# Patient Record
Sex: FEMALE | Race: WHITE | NOT HISPANIC OR LATINO | Employment: FULL TIME | URBAN - METROPOLITAN AREA
[De-identification: names, ages, dates, MRNs, and addresses within clinical notes are randomized per-mention and may not be internally consistent; named-entity substitution may affect disease eponyms.]

---

## 2018-02-27 ENCOUNTER — APPOINTMENT (OUTPATIENT)
Dept: WOMENS IMAGING | Facility: HOSPITAL | Age: 48
End: 2018-02-27

## 2018-02-27 PROCEDURE — 77067 SCR MAMMO BI INCL CAD: CPT | Performed by: RADIOLOGY

## 2019-05-22 ENCOUNTER — APPOINTMENT (OUTPATIENT)
Dept: WOMENS IMAGING | Facility: HOSPITAL | Age: 49
End: 2019-05-22

## 2019-05-22 PROCEDURE — 77063 BREAST TOMOSYNTHESIS BI: CPT | Performed by: RADIOLOGY

## 2019-05-22 PROCEDURE — 77067 SCR MAMMO BI INCL CAD: CPT | Performed by: RADIOLOGY

## 2020-05-04 ENCOUNTER — TELEPHONE (OUTPATIENT)
Dept: ORTHOPEDIC SURGERY | Facility: CLINIC | Age: 50
End: 2020-05-04

## 2020-05-27 ENCOUNTER — OFFICE VISIT (OUTPATIENT)
Dept: ORTHOPEDIC SURGERY | Facility: CLINIC | Age: 50
End: 2020-05-27

## 2020-05-27 ENCOUNTER — APPOINTMENT (OUTPATIENT)
Dept: WOMENS IMAGING | Facility: HOSPITAL | Age: 50
End: 2020-05-27

## 2020-05-27 VITALS — WEIGHT: 229 LBS | BODY MASS INDEX: 36.8 KG/M2 | HEIGHT: 66 IN | TEMPERATURE: 97 F

## 2020-05-27 DIAGNOSIS — M72.2 PLANTAR FASCIITIS: ICD-10-CM

## 2020-05-27 DIAGNOSIS — M79.672 LEFT FOOT PAIN: Primary | ICD-10-CM

## 2020-05-27 PROCEDURE — 77063 BREAST TOMOSYNTHESIS BI: CPT | Performed by: RADIOLOGY

## 2020-05-27 PROCEDURE — 99204 OFFICE O/P NEW MOD 45 MIN: CPT | Performed by: ORTHOPAEDIC SURGERY

## 2020-05-27 PROCEDURE — 73630 X-RAY EXAM OF FOOT: CPT | Performed by: ORTHOPAEDIC SURGERY

## 2020-05-27 PROCEDURE — 77067 SCR MAMMO BI INCL CAD: CPT | Performed by: RADIOLOGY

## 2020-05-27 RX ORDER — BUPROPION HYDROCHLORIDE 150 MG/1
TABLET ORAL
COMMUNITY
Start: 2020-03-06 | End: 2020-05-29

## 2020-05-27 RX ORDER — DICLOFENAC SODIUM 75 MG/1
75 TABLET, DELAYED RELEASE ORAL 2 TIMES DAILY
Qty: 60 TABLET | Refills: 1 | Status: SHIPPED | OUTPATIENT
Start: 2020-05-27 | End: 2020-07-28

## 2020-05-27 NOTE — PATIENT INSTRUCTIONS
[facility]  Tavares Villa MD    PLANTAR FASCIITIS / HEEL PAIN    Plantar Fasciitis is a very common condition that affects people of all ages.  Frequent symptoms include heel pain that is worse upon arising in the morning or when arising from prolonged sitting.  Occasionally it feels as if there is a nail driven into your heel or there is burning pain about the heel.  These symptoms may be present for months prior to being seen in the doctor’s office.      Certain conditions may aggravate this heel pain.  These conditions include recent weight gain, a change in activity, such as increasing mileage with running, running on different surfaces, a change in exercise shoes, or worn out shoes.     The heel pain is caused by inflammation of the plantar fascia.  This fascia attaches to the heel and occasionally a bone spur is seen on x-ray.  This bone spur is only associated with the inflammation and is not the cause of the pain.         Treatment is directed at decreasing the inflammation and protecting the heel.  Specific treatments we may use are listed below.    1. Stretching the Achilles tendon, hamstrings, and plantar fascia  2. Ice applied for 20 minutes in the evening daily  3. Shoes with supportive heels and cushioned mid soles  4. Night splinting  5. Nonsteroidal anti-inflammatory medications  6. Heel padding or heel cups  7. Orthotic devices    Approximately 90% of patients will have resolution of their symptoms with this treatment.  Very rarely will an injection or surgery be necessary.  Plantar fasciitis is like bursitis of the shoulder and it may take up to one year to resolve.  Like bursitis, it may flair up again and require additional treatment.     After your initial visit, if you have not improved, we will see you in 6-8 weeks to reassess our therapy.     Do not be discouraged.  You will get better, but it may take time.

## 2020-05-27 NOTE — PROGRESS NOTES
"   New Patient Complaint      Patient: Valerie Latham  YOB: 1970 49 y.o. female  Medical Record Number: [id number]    Chief Complaints: My heel hurts    History of Present Illness: Patient reports onset of pain in the plantar aspect of her left heel that began in late September/early October of last year to her she jumped into the shallow end of a pool while in [place] on a girls trip and was living in that area at that time.  She saw an orthopedist in mid-October and was felt to have plantar fasciitis or less likely a stress fracture and has since been doing some ice massage and using accommodative shoes but no stretching or night splint.  She reports moderate constant aching burning pain in the plantar aspect of the left heel but symptoms are worse upon first arising in the morning which improved some with ambulation and recur after standing from prolonged seated position.  She continues to walk 3 miles every other day doing a lot of hills at a park but it is \"not comfortable\".  Symptoms have been improved some with use of ice.        HPI    Allergies: No Known Allergies    Medications:   Current Outpatient Medications on File Prior to Visit   Medication Sig   • buPROPion XL (WELLBUTRIN XL) 150 MG 24 hr tablet      No current facility-administered medications on file prior to visit.        History reviewed. No pertinent past medical history.  Past Surgical History:   Procedure Laterality Date   • HYSTERECTOMY     • LIPOMA EXCISION       Social History     Occupational History   • Not on file   Tobacco Use   • Smoking status: Never Smoker   Substance and Sexual Activity   • Alcohol use: Yes     Comment: OCC   • Drug use: Not on file   • Sexual activity: Not on file      Social History     Social History Narrative   • Not on file     History reviewed. No pertinent family history.    Review of Systems: 14 point review of systems performed, positive pertinent findings identified in HPI. All remaining systems " "negative except headaches or migraines, hayfever    Review of Systems      Physical Exam:   Vitals:    05/27/20 0938   Temp: 97 °F (36.1 °C)   Weight: 104 kg (229 lb)   Height: 167.6 cm (66\")   PainSc:   7     Physical Exam   Constitutional: pleasant, well developed   Eyes: sclera non icteric  Hearing : adequate for exam  Cardiovascular: palpable pulses in left foot, left calf/ thigh NT without sign of DVT  Respiratoy: breathing unlabored   Neurological: grossly sensate to LT throughout left LE  Psychiatric: oriented with normal mood and affect.   Lymphatic: No palpable popliteal lymphadenopathy left LE  Skin: intact throughout left leg/foot  Musculoskeletal: She has neutral dorsiflexion of the left heel cord with moderate discomfort to palpation at the insertion of the plantar fascia but no warmth erythema.  There is minimal prominence at the insertion of the Achilles but no tenderness to palpation and no pain in the retrocalcaneal bursa with maximum plantarflexion.  No pain with medial lateral calcaneal compression.  Nontender over the dorsum of the midfoot  Physical Exam  Ortho Exam    Radiology: 3 views left foot ordered evaluate pain reviewed and no prior x-rays available for comparison but did review a report from [place] dated 10/17/2019 which described a plantar calcaneal spur and os trigonum.  X-rays today demonstrate an os trigonum as well as some small spur of the dorsum of the talar neck there is plantar calcaneal spurring and a small spur at the insertion of the Achilles with prominent superior calcaneal tuberosity.  No malalignment noted through the midfoot.    Assessment/Plan: 1.  Left heel pain consistent with plantar fasciitis  2.  Asymptomatic calcific insertional Achilles spurring and os trigonum.    Reviewed treatment options and her symptoms are fairly classic for plantar fasciitis and no see any sign of stress fracture on exam or x-ray.    Plantar fascitis etiology and  treatment protocol " discussed with pt. Information sheet provided and reviewed. This with consist of a night splint that was provided and discussed etiology of use. Towel stretch prior to first step in morning. Avoid barefoot ambulation. Gastroc and soleus heel cord stretch 4-5 times throughout the day, for 2-1/2 minutes of each session.  instruction sheet provided and demonstrated for patient. Use of shoes with arch support and ice bottle massage for 15-20 minutes each night. Counseled that improvement may take several months. Additional treatment modalities may include injection, therapy, or immobilization. I do not have anything to offer regarding surgical treatment at this time.  We will also have her start Voltaren SR 75 mg 1 p.o twice daily with GI precautions.    Also counseled to limit her activities to low impact and she wants to continue walking in the park do not do hills and only do flat level walking every other day.    I will check her back in 4 weeks and if she is not improving we will consider injection and therapy if she is we will discuss maintenance treatment going forward.    He had a pleasant visit and went to high school together her maiden name was Lynn.

## 2020-05-29 ENCOUNTER — CONSULT (OUTPATIENT)
Dept: ORTHOPEDIC SURGERY | Facility: CLINIC | Age: 50
End: 2020-05-29

## 2020-05-29 VITALS — BODY MASS INDEX: 38.55 KG/M2 | TEMPERATURE: 96 F | HEIGHT: 65 IN | WEIGHT: 231.4 LBS

## 2020-05-29 DIAGNOSIS — M53.3 COCCYX PAIN: Primary | ICD-10-CM

## 2020-05-29 DIAGNOSIS — M54.50 LUMBAR BACK PAIN: ICD-10-CM

## 2020-05-29 PROCEDURE — 20552 NJX 1/MLT TRIGGER POINT 1/2: CPT | Performed by: ORTHOPAEDIC SURGERY

## 2020-05-29 PROCEDURE — 99203 OFFICE O/P NEW LOW 30 MIN: CPT | Performed by: ORTHOPAEDIC SURGERY

## 2020-05-29 PROCEDURE — 72220 X-RAY EXAM SACRUM TAILBONE: CPT | Performed by: ORTHOPAEDIC SURGERY

## 2020-05-29 RX ORDER — METHYLPREDNISOLONE ACETATE 80 MG/ML
80 INJECTION, SUSPENSION INTRA-ARTICULAR; INTRALESIONAL; INTRAMUSCULAR; SOFT TISSUE
Status: COMPLETED | OUTPATIENT
Start: 2020-05-29 | End: 2020-05-29

## 2020-05-29 RX ADMIN — METHYLPREDNISOLONE ACETATE 80 MG: 80 INJECTION, SUSPENSION INTRA-ARTICULAR; INTRALESIONAL; INTRAMUSCULAR; SOFT TISSUE at 10:43

## 2020-05-29 NOTE — PROGRESS NOTES
New patient or new problem visit    Chief Complaint   Patient presents with   • Lumbar Spine - Initial Evaluation, Pain       HPI: She complains of coccydynia and lumbar spine pain.  Coccydynia is gone on about a year the backache longer.  No numbness tingling weakness bowel or bladder complaints sometimes she feels like she is shaking.  She is tried a ring pillow without relief.  No history of trauma.  She has a standing desk which helps somewhat with her back pain.  No numbness tingling or weakness.  No bowel or bladder complaints    PFSH: See chart- reviewed    Review of Systems   Constitutional: Negative for chills, fever and unexpected weight change.   HENT: Negative for trouble swallowing and voice change.    Eyes: Negative for visual disturbance.   Respiratory: Negative for cough and shortness of breath.    Cardiovascular: Negative for chest pain and leg swelling.   Gastrointestinal: Negative for abdominal pain, nausea and vomiting.   Endocrine: Negative for cold intolerance and heat intolerance.   Genitourinary: Negative for difficulty urinating, frequency and urgency.   Musculoskeletal: Positive for neck pain.   Skin: Negative for rash and wound.   Allergic/Immunologic: Negative for immunocompromised state.   Neurological: Negative for weakness and numbness.   Hematological: Does not bruise/bleed easily.   Psychiatric/Behavioral: Negative for dysphoric mood. The patient is not nervous/anxious.        PE: Constitutional: Vital signs above-noted.  Awake, alert and oriented    Psychiatric: Affect and insight do not appear grossly disturbed.    Pulmonary: Breathing is unlabored, color is good.    Skin: Warm, dry and normal turgor    Cardiac: Pedal pulses intact.  No edema.    Eyesight and hearing appear adequate for examination purposes      Musculoskeletal:  There is mild tenderness to percussion and palpation of the lumbosacral spine.  The coccyx is tender to palpation the skin over the area is normal.   Motion of the back appears undisturbed.  Posture is unremarkable to coronal and sagittal inspection.    The skin about the area is intact.  There is no palpable or visible deformity.  There is no local spasm.       Neurologic:   Reflexes are 2+ and symmetrical in the patellae and achilles.   Motor function is undisturbed in quadriceps, EHL, and gastrocnemius   sensation appears symmetrically intact to light touch.  In the bilateral lower extremities there is no evidence of atrophy.   Clonus is absent..  Gait appears undisturbed.  SLR test negative      MEDICAL DECISION MAKING    XRAY: X-rays of the sacrum show irregular mid coccygeal segment but overall alignment is unremarkable in appearance is unremarkable    Other: n/a    Impression: Coccydynia and low back pain    Plan: I have recommended injection of the coccyx which I performed today with good immediate relief from the anesthetic effect.  We will see what the steroid does.  With regard to the back exercise or activity, continue standing desk and return to see me if pain worsens.    Large Joint Arthrocentesis  Date/Time: 5/29/2020 10:43 AM  Consent given by: patient  Timeout: Immediately prior to procedure a time out was called to verify the correct patient, procedure, equipment, support staff and site/side marked as required   Supporting Documentation  Indications: pain   Procedure Details  Location: COCCYX INJECTION.  Needle size: 25 G  Approach: posterior  Medications administered: 2 mL lidocaine (cardiac); 80 mg methylPREDNISolone acetate 80 MG/ML  Patient tolerance: patient tolerated the procedure well with no immediate complications

## 2020-07-20 ENCOUNTER — OFFICE VISIT (OUTPATIENT)
Dept: ORTHOPEDIC SURGERY | Facility: CLINIC | Age: 50
End: 2020-07-20

## 2020-07-20 VITALS — BODY MASS INDEX: 38.15 KG/M2 | TEMPERATURE: 97.5 F | WEIGHT: 229 LBS | HEIGHT: 65 IN

## 2020-07-20 DIAGNOSIS — M72.2 PLANTAR FASCIITIS: Primary | ICD-10-CM

## 2020-07-20 PROCEDURE — 99213 OFFICE O/P EST LOW 20 MIN: CPT | Performed by: ORTHOPAEDIC SURGERY

## 2020-07-20 RX ORDER — NYSTATIN 100000 U/G
CREAM TOPICAL
COMMUNITY
Start: 2020-05-27 | End: 2020-11-13

## 2020-07-23 ENCOUNTER — HOSPITAL ENCOUNTER (OUTPATIENT)
Dept: MRI IMAGING | Facility: HOSPITAL | Age: 50
Discharge: HOME | End: 2020-07-23
Admitting: ORTHOPAEDIC SURGERY

## 2020-07-23 DIAGNOSIS — M72.2 PLANTAR FASCIITIS: ICD-10-CM

## 2020-07-23 PROCEDURE — 73721 MRI JNT OF LWR EXTRE W/O DYE: CPT

## 2020-07-27 ENCOUNTER — OFFICE VISIT (OUTPATIENT)
Dept: ORTHOPEDIC SURGERY | Facility: CLINIC | Age: 50
End: 2020-07-27

## 2020-07-27 VITALS — WEIGHT: 229 LBS | BODY MASS INDEX: 36.8 KG/M2 | TEMPERATURE: 98 F | HEIGHT: 66 IN

## 2020-07-27 DIAGNOSIS — S93.402S SPRAIN OF LEFT ANKLE, UNSPECIFIED LIGAMENT, SEQUELA: ICD-10-CM

## 2020-07-27 DIAGNOSIS — S86.312D PERONEAL TENDON TEAR, LEFT, SUBSEQUENT ENCOUNTER: ICD-10-CM

## 2020-07-27 DIAGNOSIS — M25.372 INSTABILITY OF LEFT ANKLE JOINT: ICD-10-CM

## 2020-07-27 DIAGNOSIS — M72.2 PLANTAR FASCIITIS: Primary | ICD-10-CM

## 2020-07-27 PROBLEM — S93.402A SPRAIN OF LEFT ANKLE: Status: ACTIVE | Noted: 2020-07-27

## 2020-07-27 PROCEDURE — 20550 NJX 1 TENDON SHEATH/LIGAMENT: CPT | Performed by: ORTHOPAEDIC SURGERY

## 2020-07-27 PROCEDURE — 99214 OFFICE O/P EST MOD 30 MIN: CPT | Performed by: ORTHOPAEDIC SURGERY

## 2020-07-28 RX ORDER — METHYLPREDNISOLONE ACETATE 80 MG/ML
INJECTION, SUSPENSION INTRA-ARTICULAR; INTRALESIONAL; INTRAMUSCULAR; SOFT TISSUE
Qty: 1 ML | Refills: 0
Start: 2020-07-28 | End: 2020-11-13

## 2020-07-28 RX ORDER — DICLOFENAC SODIUM 75 MG/1
TABLET, DELAYED RELEASE ORAL
Qty: 60 TABLET | Refills: 1 | Status: SHIPPED | OUTPATIENT
Start: 2020-07-28 | End: 2020-11-13

## 2020-07-28 RX ORDER — LIDOCAINE HYDROCHLORIDE 20 MG/ML
2 INJECTION, SOLUTION INFILTRATION; PERINEURAL ONCE
Status: DISCONTINUED | OUTPATIENT
Start: 2020-07-28 | End: 2021-02-12

## 2020-07-28 NOTE — PROGRESS NOTES
"Ankle Follow Up      Patient: Valerie Latham    YOB: 1970 49 y.o. female    Chief Complaints: Ankle and heel pain    History of Present Illness:Patient was initially seen on 5/27/2020 and reported onset of pain in the plantar aspect of her left heel that began in late September/early October of last year after she jumped into the shallow end of a pool while in [place] on a girls trip and was living in that area at that time.  She saw an orthopedist in mid-October and was felt to have plantar fasciitis or less likely a stress fracture and had since been doing some ice massage and using accommodative shoes but no stretching or night splint.       She reported moderate constant aching burning pain in the plantar aspect of the left heel but symptoms are worse upon first arising in the morning which improved some with ambulation and recur after standing from prolonged seated position.  She was continuing to walk 3 miles every other day doing a lot of hills at a park but it is \"not comfortable\".  Symptoms had been improved some with use of ice.     At that time I felt that symptoms were consistent with plantar fasciitis and she was instructed on use of a night splint, heel cord stretching exercises, use of arch support and ice massage as well as use of Voltaren SR 75 mg twice daily and to limit activities.     She was last seen on 7/20/2020 and had been doing as such stretching at least 3 times a day and the night splint has helped with her start up pain in the morning but still had moderate to severe stabbing pain in the plantar aspect of the heel more so than posteriorly as well as some medial and lateral discomfort after walking activities and prolonged standing.  Symptoms are exacerbated few days prior to that visit after walking a mile and a half.    She was sent for MRI and is seen back today with persistent complaints of stabbing pain in the medial aspect of the hindfoot as well as discomfort in the " "posterior lateral aspect of the ankle with prolonged walking and standing.  She is not have any appreciable history of sprains in the past.  She continues to take Voltaren SR      HPI    ROS: ankle pain, no numbness or tingling  History reviewed. No pertinent past medical history.    Physical Exam:   Vitals:    07/27/20 1454   Temp: 98 °F (36.7 °C)   TempSrc: Temporal   Weight: 104 kg (229 lb)   Height: 167.6 cm (66\")   PainSc:   6   PainLoc: Foot     Well developed with normal mood.  Exam she has moderate discomfort at the insertion of the plantar fascia as well as discomfort over the peroneal tendons to palpation worse with resisted eversion but no subluxation.  Mild discomfort with anterolateral ligamentous structures but without gross instability on anterior drawer but with some guarding.      Radiology: MRI films and report of the left hindfoot dated 7/23/2020 show a tear in the central medial aspect of the plantar aponeurosis at its origin with abnormal thickening at the medial band just distal to this tear.  Lateral fibers the medial band and lateral band are intact.  There is degenerative plantar calcaneal spur and surrounding soft tissue edema.    There is also chronic thickening of the dorsal talonavicular ligament and mild thickening of the ATFL the syndesmosis and deltoid are intact.    There is a peroneus brevis split tear and the brevis is wrapped around the anterior aspect of the longus distending the peroneal tendon sheath with mild thickening of the longus representing tendinopathy.  Also chronic mild thickening of the central Achilles tendon and an os trigonum which shows no abnormal marrow      Assessment/Plan: 1.  Left high-grade partial tear of the medial band of the plantar aponeurosis  2.  Left peroneus brevis split tear with fluid distention of the peroneal tendon sheath and peroneus longus tendinopathy  3.  Left ATFL thickening as well as thickening of the dorsal talonavicular ligament  4.  " Left Achilles tendinosis with os trigonum without marrow signal    We reviewed treatment options and nothing I would recommend for the plantar fascia.    The peroneal tendons are less symptomatic than is the plantar fascia but may eventually require surgical treatment as made the ATFL.    We reviewed treatment options and she will continue with use of her night splint and increase her heel cord stretching exercises to at least 4 5 times per day.  She will continue with Voltaren SR 75 mg 1 p.o. twice daily with GI precautions and use accommodative shoes with good arch support and may use an ASO brace for prolonged walking and standing activities to protect her ankle.    We discussed other treatment options and available in symptoms sterile preparation with discussion of risks which can include infection and complete plantar fascia rupture left plantar fascia area was injected with 2 cc of 2% lidocaine ( NDC 6332-208-05 lot # [id number] exp 2/1/23) and half cc of Depo-Medrol containing 80 mg/cc ( NDC 8897-1685-25 lot # [id number] exp 8/1/21) postinjection instructions were given continue with ice massage limit activities to those of daily living only.    Also have her start some physical therapy and I will see her back in approximately 4 weeks.

## 2020-08-13 ENCOUNTER — TREATMENT (OUTPATIENT)
Dept: PHYSICAL THERAPY | Facility: CLINIC | Age: 50
End: 2020-08-13

## 2020-08-13 DIAGNOSIS — M25.372 INSTABILITY OF LEFT ANKLE JOINT: ICD-10-CM

## 2020-08-13 DIAGNOSIS — M72.2 PLANTAR FASCIITIS: Primary | ICD-10-CM

## 2020-08-13 DIAGNOSIS — S86.312D PERONEAL TENDON TEAR, LEFT, SUBSEQUENT ENCOUNTER: ICD-10-CM

## 2020-08-13 PROCEDURE — 97110 THERAPEUTIC EXERCISES: CPT | Performed by: PHYSICAL THERAPIST

## 2020-08-13 PROCEDURE — 97140 MANUAL THERAPY 1/> REGIONS: CPT | Performed by: PHYSICAL THERAPIST

## 2020-08-13 PROCEDURE — 97530 THERAPEUTIC ACTIVITIES: CPT | Performed by: PHYSICAL THERAPIST

## 2020-08-13 PROCEDURE — 97162 PT EVAL MOD COMPLEX 30 MIN: CPT | Performed by: PHYSICAL THERAPIST

## 2020-08-13 NOTE — PROGRESS NOTES
Orthopedic / Sports / Industrial Physical Therapy  Physical Therapy Initial Evaluation and Plan of Care    Patient Name: Valerie Latham,          :  1970  Referring Physician: Tavraes Villa MD  Diagnosis:  Plantar Fasciitis (L);  Peroneal Tendon tear (L); Instability of (L) ankle joint  Date of Evaluation: 2020  __________________________________________________________    Subjective Evaluation    History of Present Illness  Mechanism of injury: Jumped into shallow end of pool causing heel pain - saw orthopedic in [place], [place] and was diagnosed with plantar fasciitis - did stretching and icing -  Since started walking programs 3-4 miles/day up/down hills -and pain increased after several weeks - Pain worse in heel and progressively got worse and sooner into walk -   Also noted onset of ankle pain (L) and saw Dr. Villa who ordered a MRI - which showed torn ligaments (per patient) - Given Cortizone in heel and ankle brace  Pt reports no known injury to ankle (sprains, etc.) other than the pool incident in [facility].         Patient Occupation: Sit at desk w/ standing desk -  Very active - walking eliptical, pilates, yoga - Pain  Current pain rating: 3  At worst pain ratin  Location: Volar heel and post/lateral ankle (L) - int numbness across toes and volar foot w/ eliptical for > 20 min, but on other time.   Quality: Dull, sharp pain - worse at times.  Alleviating factors: Brace, night splint, ice, Rest.  Exacerbated by: Walking (> 5 min), standing, sleeping w/o night splint; Stairs, uneven ground,   Symptom course: Ankle better; Heel no better -     Social Support  Patient lives at: Home w/ stairs to basement -    Diagnostic Tests  X-ray: abnormal (Per patient bone spur volar and posterior calcaneus and )  MRI studies: abnormal (See report in Epic)    Treatments  Current treatment: immobilization, injection treatment and medication  Patient Goals  Patient/family treatment goals: Pain  alleviation; Mobility, gait, strength to allow ADLs. hobbies, and normal job, etc.      MRI RESULT:2020  IMPRESSION:  1. Peroneus brevis split tear with fluid distention of the peroneal  tendon sheath/synovitis.  2. High-grade partial tear medial band plantar aponeurosis at its  origin.     This report was finalized on 2020 10:44 AM by Dr. Dean Stein M.D.   ______________________________________________  Objective          Observations     Additional Ankle/Foot Observation Details  Pt presents with lace up fig-8 ankle brace LLE -   Swelling ant, med, lat (L) ankle/foot -   Increased pronation (L) foot in standing and w/ ambulation  Very unstable w/ SLS (L)     Tenderness     Additional Tenderness Details  Tender ATFL, peroneal tendons post to lat malleolus into lateral foot (L); Tender along posterior tibialis tendon (L); Tender Volar calc and post/med volar calc (L)      Strength/Myotome Testing     Additional Strength Details  Strong DF/PF, EV/INV w/ manual resistance; Able to (B) Heel raise w/mild discomfort (L) peroneal region (L); Tender at ant/post proximal tib-fib joint (L>R)     Tests     Additional Tests Details  (-) Ant Drawer; (-) Inversion and Talar tilt (L); (-) Prox Fib Shuck / Prox tib-fib joint;         See Exercise, Manual, and Modality Logs for complete treatment.     Functional / Therapeutic Activities:  20 min  · TAPING / BRACIN) Taping to unload PF and Support medial arch (Low Dye taping) w/ Leuko tape over for medial arch support; 2) Taping to unload calcaneal fat pad (L)  · Jt protection, ADL modification; Posture and      Assessment & Plan     Assessment  Assessment details: (L) Ankle sprain; Peroneal tendon tear; Ankle Instability; Plantar fasciitis    PROBLEMS: Pain; Weakness; Abnormal gait; Intolerance to ADL's, hobbies, and normal job requiring use of LLE -   PROGNOSIS: Good    GOALS:   SHORT TERM GOALS: 2 weeks:  1) HEP Initiated; 2) Pain decreased 50%:    3) Improved proprioception grossly w/ SLS LLE w/ taping / bracin) Improved gait w/ taping / bracing;   5) improved functional ability with taping / bracing;     LONG TERM GOALS: 4 weeks (or at time of DISCHARGE): 1) (I) HEP; 2) AROM WFL and pain free; 3) Strength / mobility to be able to perform all ADL's, hobbies, and job-related activities w/w/o bracing prn; 4) Gait / endurance to allow ADL's, hobbies, and normal job w/wo bracing, etc.       Plan  Planned therapy interventions: flexibility, home exercise program, joint mobilization, manual therapy, neuromuscular re-education, postural training, orthotic fitting/training, soft tissue mobilization, strengthening, stretching and therapeutic activities (Modalities prn; Taping / bracing prn; )  Frequency: 2-3.  Duration in weeks: 4  Treatment plan discussed with: patient         _________________________________________________  Manual Therapy:    13     mins  27389;  Therapeutic Exercise:    10     mins  78637;     Neuromuscular Brayan:        mins  69195;    Therapeutic Activity:     20     mins  11475;     Gait Training:           mins  29408;     Ultrasound:     06     mins  66260;    Electrical Stimulation:         mins  01411 ( );  Dry Needling          mins self-pay  EVAL TIME:   25 min    Timed Treatment:   49   mins                  Total Treatment:     80   mins      PT SIGNATURE:   Wallace Recinos, FLACO  DATE TREATMENT INITIATED: 2020  ___________________________________________________  Initial Certification  Certification Period: 2020  I certify that the therapy services are furnished while this patient is under my care.  The services outlined above are required by this patient, and will be reviewed every 90 days.     PHYSICIAN: ________________________________  DATE: ______  Tavares Villa MD        Please sign and return via fax to [phone]. Thank you, [facility].  ______________________________________________________________________  [address] [facility] [place] [address]  Phone: [phone] Fax: [phone]    Access Code: [email or web address]   URL: [email or web address]   Date: 08/13/2020   Prepared by: Wallace Christianson  Standing Gastroc Stretch - 3-5 reps - 1 sets - 60s hold - 3x daily - 7x weekly  Standing Soleus Stretch - 3-5 reps - 1 sets - 60s hold - 3x daily - 7x weekly

## 2020-08-19 ENCOUNTER — TREATMENT (OUTPATIENT)
Dept: PHYSICAL THERAPY | Facility: CLINIC | Age: 50
End: 2020-08-19

## 2020-08-19 DIAGNOSIS — M25.372 INSTABILITY OF LEFT ANKLE JOINT: ICD-10-CM

## 2020-08-19 DIAGNOSIS — M72.2 PLANTAR FASCIITIS: Primary | ICD-10-CM

## 2020-08-19 DIAGNOSIS — S86.312D PERONEAL TENDON TEAR, LEFT, SUBSEQUENT ENCOUNTER: ICD-10-CM

## 2020-08-19 PROCEDURE — 97530 THERAPEUTIC ACTIVITIES: CPT | Performed by: PHYSICAL THERAPIST

## 2020-08-19 PROCEDURE — 97140 MANUAL THERAPY 1/> REGIONS: CPT | Performed by: PHYSICAL THERAPIST

## 2020-08-19 PROCEDURE — 97110 THERAPEUTIC EXERCISES: CPT | Performed by: PHYSICAL THERAPIST

## 2020-08-19 NOTE — PROGRESS NOTES
Physical Therapy Daily Progress Note    Patient Name: Valerie Latham         :  1970  Referring Physician: Tavares Villa MD      Subjective   Valerie Latham reports: decreased pain w/ taping of foot - No pain in heal   - Pain mostly lateral ankle and along lateral 5th ray -     Objective   Pt demonstrating good WB-ing LLE in gait -   Tender along peroneal tendons (L) - tender medial /volar-med calcaneus (L) -  Very tender ATFL (L) -   Tender proximal tib-fib joint (L) and hypermobility proximal tib-fib joint (L) -     See Exercise, Manual, and Modality Logs for complete treatment.     Functional / Therapeutic Activities:  25 min  · TAPING / BRACIN) Taping to unload PF and Support medial arch (Low Dye taping) w/ Leuko tape over for medial arch support; 2) Taping to unload calcaneal fat pad (L) 3 layers w/ leukotape 1in wide strips     3) Unload peroneal tendons (L) ankle; 4) K-tape w/ Leukotape over x 3 strips to stabilize proximal tib-fib joint   · Jt protection, ADL modification; Posture and      Assessment/Plan  (L) Ankle sprain; Peroneal tendon tear; Ankle Instability; Plantar fasciitis - Possible proximal tib-fib joint instability  Taping very helpful in decreasing pain and allowing improved gait and function -    Progress strengthening /stabilization /functional activity       _________________________________________________  Manual Therapy:    20     mins  36360;  Therapeutic Exercise:    10     mins  41365;     Neuromuscular Brayan:        mins  28072;    Therapeutic Activity:     25     mins  42084;     Gait Training:           mins  66671;     Ultrasound:     06     mins  88840;    Electrical Stimulation:         mins  28108 ( );  Dry Needling          mins self-pay    Timed Treatment:   61   mins                  Total Treatment:     65   mins    Wallace Recinos PT  Physical Therapist

## 2020-08-21 ENCOUNTER — TREATMENT (OUTPATIENT)
Dept: PHYSICAL THERAPY | Facility: CLINIC | Age: 50
End: 2020-08-21

## 2020-08-21 DIAGNOSIS — M72.2 PLANTAR FASCIITIS: Primary | ICD-10-CM

## 2020-08-21 DIAGNOSIS — M25.372 INSTABILITY OF LEFT ANKLE JOINT: ICD-10-CM

## 2020-08-21 DIAGNOSIS — S86.312D PERONEAL TENDON TEAR, LEFT, SUBSEQUENT ENCOUNTER: ICD-10-CM

## 2020-08-21 PROCEDURE — 97140 MANUAL THERAPY 1/> REGIONS: CPT | Performed by: PHYSICAL THERAPIST

## 2020-08-21 PROCEDURE — 97530 THERAPEUTIC ACTIVITIES: CPT | Performed by: PHYSICAL THERAPIST

## 2020-08-21 PROCEDURE — 97110 THERAPEUTIC EXERCISES: CPT | Performed by: PHYSICAL THERAPIST

## 2020-08-21 NOTE — PROGRESS NOTES
Physical Therapy Daily Progress Note     Patient Name: Valerie Latham         :  1970  Referring Physician: Tavares Villa MD        Subjective   Valerie Latham reports: only mild heel pain and no lateral ankle pain - no problems with exercises     Objective   Pt demonstrating good WB-ing LLE in gait -   Tender along peroneal tendons (L) - tender medial /volar-med calcaneus (L) -  Very tender ATFL (L) -   Tender proximal tib-fib joint (L) and hypermobility proximal tib-fib joint (L) -      See Exercise, Manual, and Modality Logs for complete treatment.     Functional / Therapeutic Activities:  30 min  · TAPING / BRACIN) Taping to unload PF and Support medial arch (Low Dye taping) w/ Leuko tape over for medial arch support; 2) Taping to unload calcaneal fat pad (L) 3 layers w/ leukotape 1in wide strips each direction -      3) Unload peroneal tendons (L) ankle; 4) K-tape w/ Leukotape over x 3 strips to stabilize proximal tib-fib joint   · Jt protection, ADL modification; Posture and       Assessment/Plan  (L) Ankle sprain; Peroneal tendon tear; Ankle Instability; Plantar fasciitis - Possible proximal tib-fib joint instability  Taping very helpful in decreasing pain and allowing improved gait and function -     Progress strengthening /stabilization /functional activity     _________________________________________________  Manual Therapy:            15     mins  15363;  Therapeutic Exercise:    15     mins  75489;     Neuromuscular Brayan:        mins  80858;    Therapeutic Activity:      30     mins  05508;     Gait Training:                      mins  98980;     Ultrasound:                     06     mins  07088;    Electrical Stimulation:         mins  69607 ( );  Dry Needling                       mins self-pay     Timed Treatment:   66   mins                  Total Treatment:    70   mins     Wallace Recinos PT  Physical Therapist

## 2020-08-25 ENCOUNTER — TREATMENT (OUTPATIENT)
Dept: PHYSICAL THERAPY | Facility: CLINIC | Age: 50
End: 2020-08-25

## 2020-08-25 DIAGNOSIS — M25.372 INSTABILITY OF LEFT ANKLE JOINT: ICD-10-CM

## 2020-08-25 DIAGNOSIS — S86.312D PERONEAL TENDON TEAR, LEFT, SUBSEQUENT ENCOUNTER: ICD-10-CM

## 2020-08-25 DIAGNOSIS — M72.2 PLANTAR FASCIITIS: Primary | ICD-10-CM

## 2020-08-25 PROCEDURE — 97140 MANUAL THERAPY 1/> REGIONS: CPT | Performed by: PHYSICAL THERAPIST

## 2020-08-25 PROCEDURE — 97110 THERAPEUTIC EXERCISES: CPT | Performed by: PHYSICAL THERAPIST

## 2020-08-25 PROCEDURE — 97530 THERAPEUTIC ACTIVITIES: CPT | Performed by: PHYSICAL THERAPIST

## 2020-08-25 NOTE — PROGRESS NOTES
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     Functional / Therapeutic Activities:  30 min  · TAPING / BRACIN) Taping to unload PF and Support medial arch (Low Dye taping) w/ Leuko tape over for medial arch support; 2) Taping to unload calcaneal fat pad (L) 3 layers w/ leukotape 1in wide strips each direction -      3) Unload peroneal tendons (L) ankle; 4) K-tape w/ Leukotape over x 3 strips to stabilize proximal tib-fib joint   · Jt protection, ADL modification; Posture and       Assessment/Plan  (L) Ankle sprain; Peroneal tendon tear; Ankle Instability; Plantar fasciitis - Possible proximal tib-fib joint instability  Taping very helpful in decreasing pain and allowing improved gait and function -     Progress strengthening /stabilization /functional activity     _________________________________________________  Manual Therapy:            15     mins  48563;  Therapeutic Exercise:    10     mins  09533;     Neuromuscular Brayan:        mins  94110;    Therapeutic Activity:      30     mins  57783;     Gait Training:                       mins  36251;     Ultrasound:                     06     mins  30368;    Electrical Stimulation:         mins  77738 ( );  Dry Needling                       mins self-pay     Timed Treatment:   61  mins                  Total Treatment:    70   mins     Wallace Recinos PT  Physical Therapist

## 2020-08-28 ENCOUNTER — TREATMENT (OUTPATIENT)
Dept: PHYSICAL THERAPY | Facility: CLINIC | Age: 50
End: 2020-08-28

## 2020-08-28 DIAGNOSIS — M25.372 INSTABILITY OF LEFT ANKLE JOINT: ICD-10-CM

## 2020-08-28 DIAGNOSIS — S86.312D PERONEAL TENDON TEAR, LEFT, SUBSEQUENT ENCOUNTER: ICD-10-CM

## 2020-08-28 DIAGNOSIS — M72.2 PLANTAR FASCIITIS: Primary | ICD-10-CM

## 2020-08-28 PROCEDURE — 97110 THERAPEUTIC EXERCISES: CPT | Performed by: PHYSICAL THERAPIST

## 2020-08-28 PROCEDURE — 97112 NEUROMUSCULAR REEDUCATION: CPT | Performed by: PHYSICAL THERAPIST

## 2020-08-28 PROCEDURE — 97530 THERAPEUTIC ACTIVITIES: CPT | Performed by: PHYSICAL THERAPIST

## 2020-08-28 NOTE — PROGRESS NOTES
Physical Therapy Daily Progress Note    Patient Name: Valerie Latham         :  1970  Referring Physician: Tavares Villa MD      Subjective   Valerie Latham reports: no pain since last session - Tape very helpful - No need for ankle brace - Wore casual shoes one day this week and walked nearly 12369 steps w/o pain -     Objective   Tender along peroneal tendon(s) from just post/superior to lateral malleolus around to styloid of 5th ray -   Tender along medial calcaneus, but no pain volar calcaneus or Plantar fascia region - Mild tenderness along posterior tibialis tendon    See Exercise, Manual, and Modality Logs for complete treatment.     Functional / Therapeutic Activities:  35 min  · TAPING / BRACIN) Taping to unload PF and Support medial arch (Low Dye taping) w/ Leuko tape over for medial arch support; 2) Taping to unload calcaneal fat pad (L) 3 layers w/ leukotape 1in wide strips each direction -      3) Unload peroneal tendons (L) ankle; 4) K-tape w/ Leukotape over x 3 strips to stabilize proximal tib-fib joint  · SEE EXERCISE FLOW SHEET -   · Jt protection, ADL modification; Posture and       Assessment/Plan  (L) Ankle sprain; Peroneal tendon tear; Ankle Instability; Plantar fasciitis - Possible proximal tib-fib joint instability  Continued improvement w/ decreased pain and improved functional ability -   Taping very helpful in decreasing pain and allowing improved gait and function -       Progress strengthening /stabilization /functional activity     _________________________________________________  Manual Therapy:            05     mins  82872;  Therapeutic Exercise:    08     mins  65614;     Neuromuscular Brayan:   08     mins  33016;    Therapeutic Activity:      35     mins  14422;     Gait Training:                      mins  82459;     Ultrasound:                     06     mins  81730;    Electrical Stimulation:         mins  86686 ( );  Dry  Ethel                       mins self-pay     Timed Treatment:   62  mins                  Total Treatment:    65   mins    Wallace Recinos, PT  Physical Therapist

## 2020-09-01 ENCOUNTER — TREATMENT (OUTPATIENT)
Dept: PHYSICAL THERAPY | Facility: CLINIC | Age: 50
End: 2020-09-01

## 2020-09-01 DIAGNOSIS — M72.2 PLANTAR FASCIITIS: Primary | ICD-10-CM

## 2020-09-01 DIAGNOSIS — M25.372 INSTABILITY OF LEFT ANKLE JOINT: ICD-10-CM

## 2020-09-01 DIAGNOSIS — S86.312D PERONEAL TENDON TEAR, LEFT, SUBSEQUENT ENCOUNTER: ICD-10-CM

## 2020-09-01 PROCEDURE — 97530 THERAPEUTIC ACTIVITIES: CPT | Performed by: PHYSICAL THERAPIST

## 2020-09-01 PROCEDURE — 97140 MANUAL THERAPY 1/> REGIONS: CPT | Performed by: PHYSICAL THERAPIST

## 2020-09-01 PROCEDURE — 97110 THERAPEUTIC EXERCISES: CPT | Performed by: PHYSICAL THERAPIST

## 2020-09-04 ENCOUNTER — TREATMENT (OUTPATIENT)
Dept: PHYSICAL THERAPY | Facility: CLINIC | Age: 50
End: 2020-09-04

## 2020-09-04 DIAGNOSIS — M25.372 INSTABILITY OF LEFT ANKLE JOINT: ICD-10-CM

## 2020-09-04 DIAGNOSIS — S86.312D PERONEAL TENDON TEAR, LEFT, SUBSEQUENT ENCOUNTER: ICD-10-CM

## 2020-09-04 DIAGNOSIS — M72.2 PLANTAR FASCIITIS: Primary | ICD-10-CM

## 2020-09-04 PROCEDURE — 97760 ORTHOTIC MGMT&TRAING 1ST ENC: CPT | Performed by: PHYSICAL THERAPIST

## 2020-09-04 PROCEDURE — 97530 THERAPEUTIC ACTIVITIES: CPT | Performed by: PHYSICAL THERAPIST

## 2020-09-08 ENCOUNTER — TREATMENT (OUTPATIENT)
Dept: PHYSICAL THERAPY | Facility: CLINIC | Age: 50
End: 2020-09-08

## 2020-09-08 DIAGNOSIS — S86.312D PERONEAL TENDON TEAR, LEFT, SUBSEQUENT ENCOUNTER: ICD-10-CM

## 2020-09-08 DIAGNOSIS — M25.372 INSTABILITY OF LEFT ANKLE JOINT: ICD-10-CM

## 2020-09-08 DIAGNOSIS — M72.2 PLANTAR FASCIITIS: Primary | ICD-10-CM

## 2020-09-08 PROCEDURE — 97035 APP MDLTY 1+ULTRASOUND EA 15: CPT | Performed by: PHYSICAL THERAPIST

## 2020-09-08 PROCEDURE — 97140 MANUAL THERAPY 1/> REGIONS: CPT | Performed by: PHYSICAL THERAPIST

## 2020-09-08 PROCEDURE — 97530 THERAPEUTIC ACTIVITIES: CPT | Performed by: PHYSICAL THERAPIST

## 2020-09-08 PROCEDURE — 97110 THERAPEUTIC EXERCISES: CPT | Performed by: PHYSICAL THERAPIST

## 2020-09-08 NOTE — PROGRESS NOTES
Physical Therapy Daily Progress Note    Patient Name: Valerie Latham         :  1970  Referring Physician: Tavares Villa MD      Subjective   Valerie Latham reports: decreased pain with insert and strapping of upper leg - Pain mostly lateral ankle - Got acupuncture / dry needling and felt better - [place] / uneven ground and did well and has not noticed increased pain since - only noted mild heel discomfort for part of the walk - Noted  Pain down lateral foot to base of 5th ray after her walk yesterday -       Objective   Pt ambulating with increased WB-ing LLE -    Tender, but less intense along peroneal tendons into styloid 5th ray (L) with no palpable subluxation noted- Tender, but less intense medial calcaneus (L)   Tender ATFL region    See Exercise, Manual, and Modality Logs for complete treatment.     Functional / Therapeutic Activities:  15 min  · TAPING / BRACING: K-Tape to Unload Peroneals (L) and to facilitate calcaneal valgus w/ leukotape over- (L)   · Reviewed fitting of and adjusted strap used to stabilize proximal tib-fib joint (L) -   · Jt protection, ADL modification; Posture and      Assessment/Plan  (L) Ankle sprain; Peroneal tendon tear; Ankle Instability; Plantar fasciitis - Possible proximal tib-fib joint instability - medial calcaneal impingement (L)  Continued improvement w/ decreased pain and improved functional ability  Taping / now strapping to stabilize proximal tib-fib joint helpful in reducing lateral ankle pain and customized orthotic decreasing heel pain and K-tape to peroneals helplful as well -   Would benefit from Dry Needling to peroneals and ATFL -     Progress strengthening /stabilization /functional activity - Adjust / modify orthotic, taping, etc prn -      _________________________________________________  Manual Therapy:    20     mins  27752;  Therapeutic Exercise:    15     mins  99856;     Neuromuscular Brayan:        mins   14769;    Therapeutic Activity:     15     mins  59945;     Orthotic Chapin/Fit:                   mins  67498     Ultrasound:     06     mins  06732;    Electrical Stimulation:         mins  90966 ( );  Dry Needling          mins self-pay    Timed Treatment:   56   mins                  Total Treatment:     60   mins    Wallace Recinos, PT  Physical Therapist

## 2020-09-11 ENCOUNTER — TREATMENT (OUTPATIENT)
Dept: PHYSICAL THERAPY | Facility: CLINIC | Age: 50
End: 2020-09-11

## 2020-09-11 DIAGNOSIS — S86.312D PERONEAL TENDON TEAR, LEFT, SUBSEQUENT ENCOUNTER: ICD-10-CM

## 2020-09-11 DIAGNOSIS — M25.372 INSTABILITY OF LEFT ANKLE JOINT: ICD-10-CM

## 2020-09-11 DIAGNOSIS — M72.2 PLANTAR FASCIITIS: Primary | ICD-10-CM

## 2020-09-11 PROCEDURE — 97530 THERAPEUTIC ACTIVITIES: CPT | Performed by: PHYSICAL THERAPIST

## 2020-09-11 PROCEDURE — 97140 MANUAL THERAPY 1/> REGIONS: CPT | Performed by: PHYSICAL THERAPIST

## 2020-09-11 PROCEDURE — 97035 APP MDLTY 1+ULTRASOUND EA 15: CPT | Performed by: PHYSICAL THERAPIST

## 2020-09-11 NOTE — PROGRESS NOTES
Physical Therapy Daily Progress Note    Patient Name: Valerie Latham         :  1970  Referring Physician: Tavares Villa MD      Subjective   Valerie Latham reports: walking 2+ miles two days in a row w/ minimal / no pain - Some posterior / proximal lower leg discomfort from strap, but no heel or lateral ankle pain until this morning when she notes some lateral ankle / peroneal discomfort, but thinks is b/c she did not wear her strap to stabilize her proximal tib-fib joint for a while yesterday -     Objective   Pt ambulating w/ gait WNL w/ good WB-ing thru LLE -   Less tender along peroneal tendons  Tender medial calcaneus (L)    See Exercise, Manual, and Modality Logs for complete treatment.     Functional / Therapeutic Activities:  20 min  · TAPING / BRACING: K-tape to 1) Unload Peroneal tendons; 2) Facilitate calc valgus w/ leukotape over x 1 strip (L)   · SEE EXERCISE FLOW SHEET -     Assessment/Plan  (L) Ankle sprain; Peroneal tendon tear; Ankle Instability; Plantar fasciitis - Possible proximal tib-fib joint instability - medial calcaneal impingement (L)  Continued improvement w/ decreased pain and improved functional ability  Taping / now strapping to stabilize proximal tib-fib joint helpful in reducing lateral ankle pain and customized orthotic decreasing heel pain and K-tape to peroneals helplful as well -   May benefit from Dry Needling to peroneals and ATFL -      Progress strengthening /stabilization /functional activity - Adjust / modify orthotic, taping, etc prn -      _________________________________________________  Manual Therapy:    28     mins  43831;  Therapeutic Exercise:         mins  86093;     Neuromuscular Brayan:        mins  30984;    Therapeutic Activity:     20     mins  11660;     Gait Training:           mins  06869;     Ultrasound:    10      mins  64854;    Electrical Stimulation:         mins  84677 ( );  Dry Needling          mins self-pay    Timed Treatment:    58   mins                  Total Treatment:     60   mins    Wallace Recinos, PT  Physical Therapist

## 2020-09-18 ENCOUNTER — TREATMENT (OUTPATIENT)
Dept: PHYSICAL THERAPY | Facility: CLINIC | Age: 50
End: 2020-09-18

## 2020-09-18 DIAGNOSIS — M72.2 PLANTAR FASCIITIS: Primary | ICD-10-CM

## 2020-09-18 DIAGNOSIS — S86.312D PERONEAL TENDON TEAR, LEFT, SUBSEQUENT ENCOUNTER: ICD-10-CM

## 2020-09-18 DIAGNOSIS — M25.372 INSTABILITY OF LEFT ANKLE JOINT: ICD-10-CM

## 2020-09-18 PROCEDURE — 97140 MANUAL THERAPY 1/> REGIONS: CPT | Performed by: PHYSICAL THERAPIST

## 2020-09-18 PROCEDURE — 97530 THERAPEUTIC ACTIVITIES: CPT | Performed by: PHYSICAL THERAPIST

## 2020-09-18 PROCEDURE — 97760 ORTHOTIC MGMT&TRAING 1ST ENC: CPT | Performed by: PHYSICAL THERAPIST

## 2020-09-18 NOTE — PROGRESS NOTES
------------------------------------------------------------------------------------------------------   MD PROGRESS NOTE    Patient: Valerie Latham        : 1970  Diagnosis/ICD-10 Code:  Plantar fasciitis [M72.2]  Referring practitioner: Tavares Villa MD  Date of Initial Visit: 2020                  Today's Date: 2020  _________________________________________________________________    Thank you for the referral of Ms. Latham to [facility].  Ms. Latham has attended 10 PT sessions and their treatment has consisted of: modalities prn, manual therapy, therapeutic exercise, taping, bracing, orthotic fabrication/modification, patient education, and HEP.     Subjective   Valerie Latham reports: continued improvement with decreased pain and improved mobility and function - Customized inserts have been very effective in alleviating her heel and lateral ankle / peroneal pain and allowing her to walk 2 miles w/o pain.  - Has not needed her ankle brace - Strap/brace to stabilize upper leg very helpful in lessening her lateral ankle pain -   ___________________________________________________________________  Objective              OBSERVATION: Pt ambulating w/ near normal gait w/ increased WB-ing LLE -               PALPATION: Less tender along peroneal tendons (L); Less tender inferior, inf/med calcaneus; Tender ATFL (L);  Very tender Proximal Tib-Fib joint (L) -        AROM: WNL   STRENGTH: Strong ankle strength EV/INV w/ minimal pain   SENSATION: WNL to LT   SPECIAL TESTS: Minimal discomfort INV and Talar tilt stress IL) ankle - Painful / hypermobility w/ shuck test proximal fibula at prox tib-fib joint (L) -    ACTIVITY TOLERANCE: Much improved tolerance to ADL's and hobby activiites with customized orthotics and stabilization of Proximal Tib-Fib Joint (L) -      ___________________________________________________________________   Assessment/Plan  L) Ankle sprain; Peroneal  tendon tear; Ankle Instability; Plantar fasciitis - Possible proximal tib-fib joint instability - medial calcaneal impingement (L);  Continued improvement w/ decreased pain and improved functional ability  Taping / now strapping to stabilize proximal tib-fib joint helpful in reducing lateral ankle pain and customized orthotic decreasing heel pain and K-tape to peroneals helplful as well -   GOAL STATUS: STG All Met                             LTG: Progressing towards pain, function goals    Ms. Latham would benefit from continued Physical Therapy.     P: Recommend continued Physical Therapy to allow a full and safe return to ADL's and normal job duties 1-2 times/wk x 4 weeks.    Please advise after your exam.    Thank you again for this referral of Ms. Latham to [facility].    PT Signature: ______________________________   Wallace Recinos, PT  ______________________________________________________  Based upon review of the patient's progress and continued therapy plan, it is my medical opinion that Valerie Latham should continue physical therapy treatment per the recommendation above.     Signature: ____________________________   Date: ________     Tavares Villa MD    ______________________________________________________________________  [address] [place] [address]  Phone: [phone] Fax: [phone]

## 2020-09-18 NOTE — PROGRESS NOTES
Physical Therapy Daily Progress Note    Patient Name: Valerie Latham         :  1970  Referring Physician: Tavares Villa MD      Subjective   Valerie Latham reports:  doing well walking aboiut  2miles for 3 days in a row w/o pain until the 3rd day - felt like her foot  / ankle was rolling in and she started having pain lateral ankle and lateral/volar calcaneus -    Objective   -SEE MD PROGRESS NOTE-  Insert assessed - Lateral border of rear foot posting very hard w/ sharp edge -     See Exercise, Manual, and Modality Logs for complete treatment.     23887 Orthotic Chapin / Fit ;  Modified rear-foot lateral posting to reduce sharp edge and adding padding to lateral aspect to increase cushion to same - x 23 min  Functional / Therapeutic Activities:  20 min  · TAPING / BRACING: K-Tape to 1) Unload Peroneal tendons (L); 2) K-Tape w/ Leukotape over to facilitate calcaneal valgus to reduce medial calcaneal impingement -   · SEE EXERCISE FLOW SHEET -   ·   · Jt protection, ADL modification; Posture and      Assessment/Plan  (L) Ankle sprain; Peroneal tendon tear; Ankle Instability; Plantar fasciitis - Possible proximal tib-fib joint instability - medial calcaneal impingement (L)  Continued improvement w/ decreased pain and improved functional ability  Taping / now strapping to stabilize proximal tib-fib joint helpful in reducing lateral ankle pain and customized orthotic decreasing heel pain and K-tape to peroneals helplful as well -   Modifications to orthotic alleviated her pain and was able to walk 10 min w/o pain -     Progress strengthening /stabilization /functional activity - modify orthotics prn        _________________________________________________  Manual Therapy:    15     mins  97009;  Therapeutic Exercise:    05     mins  44038;     Neuromuscular Brayan:        mins  98752;    Therapeutic Activity:     20     mins  54415;     Orthotic Chapin / Fit:    23     mins   60019;     Ultrasound:      06     mins  04402;    Electrical Stimulation:         mins  04099 ( );  Dry Needling          mins self-pay    Timed Treatment:   69   mins                  Total Treatment:     75   mins    Wallace Recinos, PT  Physical Therapist

## 2020-09-30 ENCOUNTER — TREATMENT (OUTPATIENT)
Dept: PHYSICAL THERAPY | Facility: CLINIC | Age: 50
End: 2020-09-30

## 2020-09-30 DIAGNOSIS — M72.2 PLANTAR FASCIITIS: Primary | ICD-10-CM

## 2020-09-30 DIAGNOSIS — S86.312D PERONEAL TENDON TEAR, LEFT, SUBSEQUENT ENCOUNTER: ICD-10-CM

## 2020-09-30 DIAGNOSIS — M25.372 INSTABILITY OF LEFT ANKLE JOINT: ICD-10-CM

## 2020-09-30 PROCEDURE — 97110 THERAPEUTIC EXERCISES: CPT | Performed by: PHYSICAL THERAPIST

## 2020-09-30 PROCEDURE — 97530 THERAPEUTIC ACTIVITIES: CPT | Performed by: PHYSICAL THERAPIST

## 2020-09-30 PROCEDURE — 97140 MANUAL THERAPY 1/> REGIONS: CPT | Performed by: PHYSICAL THERAPIST

## 2020-09-30 NOTE — PATIENT INSTRUCTIONS
Continue HEP, orthotics and brace to stabilize prox tib-fib joint  Keep tape on unless skin burns or itches or increases pain.

## 2020-09-30 NOTE — PROGRESS NOTES
Physical Therapy Daily Progress Note    Patient Name: Valerie Latham         :  1970  Referring Physician: Tavares Villa MD      Subjective   Valerie Latham reports: doing pretty good, rather inactive, but nothing is hurting -     Objective   Pt ambulating with near normal gait with good WB-ing thru LLE  Minimal tenderness along peroneal tendons (L) - Much less tender medial calcaneus (L)      See Exercise, Manual, and Modality Logs for complete treatment.     Functional / Therapeutic Activities:  20 min  · TAPING / BRACING: K-Tape to 1) Unload Peroneal tendons (L)  · SEE EXERCISE FLOW SHEET -   · Jt protection, ADL modification; Posture and      Assessment/Plan  (L) Ankle sprain; Peroneal tendon tear; Ankle Instability; Plantar fasciitis - Possible proximal tib-fib joint instability - medial calcaneal impingement (L)  Continued improvement w/ decreased pain and improved functional ability  Taping / now strapping to stabilize proximal tib-fib joint helpful in reducing lateral ankle pain and customized orthotic decreasing heel pain and K-tape to peroneals helplful as well -   Modifications to orthotic alleviated her pain and was able to walk 10 min w/o pain -     Progress strengthening /stabilization /functional activity       _________________________________________________  Manual Therapy:    20     mins  92453;  Therapeutic Exercise:    10     mins  11704;     Neuromuscular Brayan:        mins  60207;    Therapeutic Activity:     20     mins  73631;     Gait Training:           mins  84465;     Ultrasound:     05     mins  90493;    Electrical Stimulation:         mins  58732 ( );  Dry Needling          mins self-pay    Timed Treatment:   55  mins                  Total Treatment:     60   mins    Wallace Recinos PT  Physical Therapist

## 2020-10-13 ENCOUNTER — TREATMENT (OUTPATIENT)
Dept: PHYSICAL THERAPY | Facility: CLINIC | Age: 50
End: 2020-10-13

## 2020-10-13 DIAGNOSIS — S86.312D PERONEAL TENDON TEAR, LEFT, SUBSEQUENT ENCOUNTER: ICD-10-CM

## 2020-10-13 DIAGNOSIS — M25.372 INSTABILITY OF LEFT ANKLE JOINT: ICD-10-CM

## 2020-10-13 DIAGNOSIS — M72.2 PLANTAR FASCIITIS: Primary | ICD-10-CM

## 2020-10-13 PROCEDURE — 97140 MANUAL THERAPY 1/> REGIONS: CPT | Performed by: PHYSICAL THERAPIST

## 2020-10-13 PROCEDURE — 97035 APP MDLTY 1+ULTRASOUND EA 15: CPT | Performed by: PHYSICAL THERAPIST

## 2020-10-13 PROCEDURE — 97110 THERAPEUTIC EXERCISES: CPT | Performed by: PHYSICAL THERAPIST

## 2020-10-13 PROCEDURE — 97530 THERAPEUTIC ACTIVITIES: CPT | Performed by: PHYSICAL THERAPIST

## 2020-10-13 NOTE — PROGRESS NOTES
"Physical Therapy Daily Progress Note    Patient Name: Valerie Latham         :  1970  Referring Physician: Tavares Villa MD    Subjective   Valerie Latham reports: doing well - Tried using eliptical w/o her regular shoe for 10 minutes on Saturday and noted increased pain in lateral ankle and volar heel the next day - better now, but still \"fussy\" -     Objective   Tender peroneal tendons, medial calcaneus, most distal achilles insertion -     See Exercise, Manual, and Modality Logs for complete treatment.     Functional / Therapeutic Activities:  10 min  · TAPING / BRACING: K-Tape to 1) Unload (L) Peroneals; 2) facilitate (L) calc valgus w/ Leukotape over x 2 strips   · Jt protection, ADL modification; Posture and      Assessment/Plan  (L) Ankle sprain; Peroneal tendon tear; Ankle Instability; Plantar fasciitis - Possible proximal tib-fib joint instability - medial calcaneal impingement (L)  Continued improvement w/ decreased pain and improved functional ability  Taping / now strapping to stabilize proximal tib-fib joint helpful in reducing lateral ankle pain and customized orthotic decreasing heel pain and K-tape to peroneals helplful as well -   Modifications to orthotic alleviated her pain and was able to walk 10 min w/o pain -     Flare up after exercising w/o her normal shoes and orthotics -     Progress strengthening /stabilization /functional activity       _________________________________________________  Manual Therapy:    20     mins  23392;  Therapeutic Exercise:    10     mins  47249;     Neuromuscular Brayan:        mins  97456;    Therapeutic Activity:     10     mins  15373;     Gait Training:           mins  98393;     Ultrasound:     10     mins  53023;    Electrical Stimulation:         mins  02769 ( );  Dry Needling          mins self-pay    Timed Treatment:   50   mins                  Total Treatment:     55   mins    Wallace Recinos PT  Physical Therapist  "

## 2020-10-15 ENCOUNTER — TREATMENT (OUTPATIENT)
Dept: PHYSICAL THERAPY | Facility: CLINIC | Age: 50
End: 2020-10-15

## 2020-10-15 DIAGNOSIS — S86.312D PERONEAL TENDON TEAR, LEFT, SUBSEQUENT ENCOUNTER: ICD-10-CM

## 2020-10-15 DIAGNOSIS — M25.372 INSTABILITY OF LEFT ANKLE JOINT: ICD-10-CM

## 2020-10-15 DIAGNOSIS — M72.2 PLANTAR FASCIITIS: Primary | ICD-10-CM

## 2020-10-15 PROCEDURE — 97530 THERAPEUTIC ACTIVITIES: CPT | Performed by: PHYSICAL THERAPIST

## 2020-10-15 PROCEDURE — 97140 MANUAL THERAPY 1/> REGIONS: CPT | Performed by: PHYSICAL THERAPIST

## 2020-10-15 PROCEDURE — 97110 THERAPEUTIC EXERCISES: CPT | Performed by: PHYSICAL THERAPIST

## 2020-10-15 NOTE — PATIENT INSTRUCTIONS
Continue HEP orthotics and brace for prox tib-fib jt (L)  Keep tape on unless skin burns or itches or increases pain.

## 2020-10-15 NOTE — PROGRESS NOTES
Physical Therapy Daily Progress Note     Patient Name: Valerie Latham         :  1970  Referring Physician: Tavares Villa MD     Subjective   Valerie Latham reports: feeling better - stretching and doing exercises - feels like she lost strength, but recovered and felt good -      Objective   Pt ambulating with good WB-ing thru LLE -   Tender peroneal tendons, medial calcaneus, most distal achilles insertion -      See Exercise, Manual, and Modality Logs for complete treatment.     Functional / Therapeutic Activities:  10 min  · TAPING / BRACING: K-Tape to 1) Unload (L) Peroneals; 2) facilitate (L) calc valgus w/ Leukotape over x 2 strips   · Jt protection, ADL modification; Posture and       Assessment/Plan  (L) Ankle sprain; Peroneal tendon tear; Ankle Instability; Plantar fasciitis - Possible proximal tib-fib joint instability - medial calcaneal impingement (L)  Continued improvement w/ decreased pain and improved functional ability  Taping / now strapping to stabilize proximal tib-fib joint helpful in reducing lateral ankle pain and customized orthotic decreasing heel pain and K-tape to peroneals helplful as well -   Modifications to orthotic alleviated her pain and was able to walk 10 min w/o pain -      Flare up after exercising w/o her normal shoes and orthotics improving -      Progress strengthening /stabilization /functional activity     _________________________________________________  Manual Therapy:            20     mins  24748;  Therapeutic Exercise:    10     mins  33218;     Neuromuscular Brayan:        mins  88962;    Therapeutic Activity:      10     mins  97329;     Gait Training:                      mins  62411;     Ultrasound:                     10     mins  67060;    Electrical Stimulation:         mins  77054 ( );  Dry Needling                       mins self-pay     Timed Treatment:   50   mins                  Total Treatment:     55   mins     Wallace Recinos  PT  Physical Therapist

## 2020-10-26 RX ORDER — DICLOFENAC SODIUM 75 MG/1
TABLET, DELAYED RELEASE ORAL
Qty: 60 TABLET | Refills: 1 | OUTPATIENT
Start: 2020-10-26

## 2020-10-26 NOTE — TELEPHONE ENCOUNTER
Please let her know that is been 3 months since I have seen her and would recommend that if she needs additional anti-inflammatories that she get these through her primary care provider so that liver and kidney functions can be monitored.  Please schedule her a follow-up appointment to see me in the office if she still having problems.

## 2020-11-13 ENCOUNTER — OFFICE VISIT (OUTPATIENT)
Dept: INTERNAL MEDICINE | Facility: CLINIC | Age: 50
End: 2020-11-13

## 2020-11-13 VITALS
RESPIRATION RATE: 16 BRPM | BODY MASS INDEX: 37.35 KG/M2 | TEMPERATURE: 98.4 F | WEIGHT: 232.4 LBS | SYSTOLIC BLOOD PRESSURE: 138 MMHG | HEIGHT: 66 IN | HEART RATE: 63 BPM | OXYGEN SATURATION: 99 % | DIASTOLIC BLOOD PRESSURE: 88 MMHG

## 2020-11-13 DIAGNOSIS — Z13.220 ENCOUNTER FOR LIPID SCREENING FOR CARDIOVASCULAR DISEASE: ICD-10-CM

## 2020-11-13 DIAGNOSIS — Z13.6 ENCOUNTER FOR LIPID SCREENING FOR CARDIOVASCULAR DISEASE: ICD-10-CM

## 2020-11-13 DIAGNOSIS — F48.9 TENSION: ICD-10-CM

## 2020-11-13 DIAGNOSIS — Z12.11 COLON CANCER SCREENING: ICD-10-CM

## 2020-11-13 DIAGNOSIS — Z23 NEEDS FLU SHOT: ICD-10-CM

## 2020-11-13 DIAGNOSIS — Z00.00 HEALTHCARE MAINTENANCE: ICD-10-CM

## 2020-11-13 DIAGNOSIS — F41.9 ANXIETY: ICD-10-CM

## 2020-11-13 DIAGNOSIS — Z76.89 ENCOUNTER TO ESTABLISH CARE: Primary | ICD-10-CM

## 2020-11-13 PROCEDURE — 90471 IMMUNIZATION ADMIN: CPT | Performed by: NURSE PRACTITIONER

## 2020-11-13 PROCEDURE — 90686 IIV4 VACC NO PRSV 0.5 ML IM: CPT | Performed by: NURSE PRACTITIONER

## 2020-11-13 PROCEDURE — 99203 OFFICE O/P NEW LOW 30 MIN: CPT | Performed by: NURSE PRACTITIONER

## 2020-11-13 RX ORDER — CYCLOBENZAPRINE HCL 5 MG
5 TABLET ORAL 3 TIMES DAILY PRN
Qty: 30 TABLET | Refills: 2 | Status: SHIPPED | OUTPATIENT
Start: 2020-11-13 | End: 2021-02-12 | Stop reason: SDUPTHER

## 2020-11-13 NOTE — PROGRESS NOTES
"Subjective   Valerie Latham is a 50 y.o. female.   Chief Complaint   Patient presents with   • Establish Care     Pt presents here today to establish care.   Stress, tension    Patient presents to establish care.  This is a 50-year-old female.  This patient is new to me.    She moved to the area from [facility] about a year ago.    She drinks alcohol monthly or less, not in excess.  Never smoker.  She has started to exercise more, striving for 5 days/week consistently.  Endorses a generally well-balanced diet.    History of plantar fasciitis treated by Dr. Villa and coccydynia treated by Dr. Beatty.    She has a gynecologist in the area, Dr. Galvez.  Up-to-date on Pap smears and mammograms per gynecology.    She has had increasing stress and anxiety with her job which is very demanding.  She has been clenching her jaw at night and is using a bite guard which is helping.  She is also getting acupuncture for the jaw tension which is helping.  She has been getting some tension headaches which she is treating with essential oils and Excedrin.  She has a lot of stress in her neck and upper back and at night is having some muscle spasms and tension throughout the neck and back.  Denies SI or HI.  She has tried Wellbutrin before which left her feeling \"flushed and more anxious.\"      She denies development of any other new issues today.       The following portions of the patient's history were reviewed and updated as appropriate: allergies, current medications, past family history, past medical history, past social history, past surgical history and problem list.    Review of Systems   Constitutional: Negative for activity change, chills, fatigue, fever, unexpected weight gain and unexpected weight loss.   HENT: Negative for congestion, hearing loss, postnasal drip, sinus pressure, sneezing, sore throat, swollen glands and tinnitus.    Eyes: Negative for photophobia, pain and visual disturbance.   Respiratory: " "Negative for cough, chest tightness, shortness of breath and wheezing.    Cardiovascular: Negative for chest pain, palpitations and leg swelling.   Gastrointestinal: Negative for abdominal distention, abdominal pain, constipation, diarrhea, nausea and vomiting.   Endocrine: Negative for polydipsia, polyphagia and polyuria.   Genitourinary: Negative for dysuria, frequency, hematuria and urgency.   Musculoskeletal: Positive for arthralgias.   Neurological: Positive for headache. Negative for dizziness, weakness and numbness.   Psychiatric/Behavioral: Positive for stress. Negative for suicidal ideas.   All other systems reviewed and are negative.      Objective    /88 (BP Location: Left arm, Patient Position: Sitting, Cuff Size: Adult)   Pulse 63   Temp 98.4 °F (36.9 °C) (Oral)   Resp 16   Ht 167.6 cm (66\")   Wt 105 kg (232 lb 6.4 oz)   SpO2 99%   BMI 37.51 kg/m²     Physical Exam  Vitals signs and nursing note reviewed.   Constitutional:       General: She is not in acute distress.     Appearance: Normal appearance. She is well-developed. She is not ill-appearing, toxic-appearing or diaphoretic.   HENT:      Head: Normocephalic and atraumatic.      Right Ear: Tympanic membrane, ear canal and external ear normal.      Left Ear: Tympanic membrane and ear canal normal.   Eyes:      Pupils: Pupils are equal, round, and reactive to light.   Neck:      Musculoskeletal: Normal range of motion and neck supple. No neck rigidity or muscular tenderness.      Vascular: No carotid bruit.   Cardiovascular:      Rate and Rhythm: Normal rate and regular rhythm.      Pulses: Normal pulses.      Heart sounds: Normal heart sounds.      Comments: No peripheral edema  Pulmonary:      Effort: Pulmonary effort is normal. No respiratory distress.      Breath sounds: Normal breath sounds. No stridor. No wheezing, rhonchi or rales.   Chest:      Chest wall: No tenderness.   Abdominal:      General: Bowel sounds are normal. There " is no distension.      Palpations: Abdomen is soft. There is no mass.      Tenderness: There is no abdominal tenderness. There is no right CVA tenderness, left CVA tenderness, guarding or rebound.      Hernia: No hernia is present.   Musculoskeletal: Normal range of motion.   Lymphadenopathy:      Cervical: No cervical adenopathy.   Skin:     General: Skin is warm and dry.      Capillary Refill: Capillary refill takes less than 2 seconds.   Neurological:      General: No focal deficit present.      Mental Status: She is alert and oriented to person, place, and time. Mental status is at baseline.   Psychiatric:         Mood and Affect: Mood normal.         Behavior: Behavior normal.         Thought Content: Thought content normal.         Judgment: Judgment normal.       Current outpatient and discharge medications have been reconciled for the patient.  Reviewed by: SUMANTH Blanco      Assessment/Plan   Diagnoses and all orders for this visit:    1. Encounter to establish care (Primary)  -     CBC No Differential; Future  -     Comprehensive metabolic panel; Future  -     Lipid panel; Future  -     TSH Rfx On Abnormal To Free T4; Future  -     Hepatitis C antibody; Future    2. Healthcare maintenance  -     CBC No Differential; Future  -     Comprehensive metabolic panel; Future  -     TSH Rfx On Abnormal To Free T4; Future  -     Hepatitis C antibody; Future  -     FluLaval Quad >6 Months (7061-5137)    3. Encounter for lipid screening for cardiovascular disease  -     Lipid panel; Future    4. Anxiety    5. Tension  -     cyclobenzaprine (FLEXERIL) 5 MG tablet; Take 1 tablet by mouth 3 (Three) Times a Day As Needed for Muscle Spasms.  Dispense: 30 tablet; Refill: 2    6. Needs flu shot  -     FluLaval Quad >6 Months (4297-6345)    7. Colon cancer screening  -     Ambulatory Referral For Screening Colonoscopy    -Establish care: Reviewed patient's medical, surgical and social history.    -One-time hep C screen  today. Flu shot today.  Routine mammograms and Pap smears with Dr. Galvez, GYN.    -Lipid panel today.  She endorses a history of high triglycerides.  Discussed dietary modifications.    -Anxiety neck tension: She is going to start exercising more and has been doing acupuncture.  She will continue with both and I prescribed Flexeril that she can take in the evenings to help with the neck tension.  Discussed potential side effects of muscle relaxers.    -Screening colonoscopy ordered.    -She wishes to hold off on any medication for anxiety.  She did not tolerate Wellbutrin well.  Discussed stress reduction techniques.  We will follow-up in 3 months on anxiety.    -We will contact patient with her lab results and any further recommendations.  Follow-up as needed and in 3 months on stress/anxiety.

## 2020-11-16 ENCOUNTER — RESULTS ENCOUNTER (OUTPATIENT)
Dept: INTERNAL MEDICINE | Facility: CLINIC | Age: 50
End: 2020-11-16

## 2020-11-16 DIAGNOSIS — Z13.6 ENCOUNTER FOR LIPID SCREENING FOR CARDIOVASCULAR DISEASE: ICD-10-CM

## 2020-11-16 DIAGNOSIS — Z76.89 ENCOUNTER TO ESTABLISH CARE: ICD-10-CM

## 2020-11-16 DIAGNOSIS — Z00.00 HEALTHCARE MAINTENANCE: ICD-10-CM

## 2020-11-16 DIAGNOSIS — Z13.220 ENCOUNTER FOR LIPID SCREENING FOR CARDIOVASCULAR DISEASE: ICD-10-CM

## 2020-11-19 ENCOUNTER — TELEPHONE (OUTPATIENT)
Dept: GASTROENTEROLOGY | Facility: CLINIC | Age: 50
End: 2020-11-19

## 2020-11-24 LAB
ALBUMIN SERPL-MCNC: 4.4 G/DL (ref 3.5–5.2)
ALBUMIN/GLOB SERPL: 1.9 G/DL
ALP SERPL-CCNC: 45 U/L (ref 39–117)
ALT SERPL-CCNC: 17 U/L (ref 1–33)
AST SERPL-CCNC: 14 U/L (ref 1–32)
BILIRUB SERPL-MCNC: 0.5 MG/DL (ref 0–1.2)
BUN SERPL-MCNC: 11 MG/DL (ref 6–20)
BUN/CREAT SERPL: 14.5 (ref 7–25)
CALCIUM SERPL-MCNC: 9.5 MG/DL (ref 8.6–10.5)
CHLORIDE SERPL-SCNC: 99 MMOL/L (ref 98–107)
CHOLEST SERPL-MCNC: 185 MG/DL (ref 0–200)
CO2 SERPL-SCNC: 27.3 MMOL/L (ref 22–29)
CREAT SERPL-MCNC: 0.76 MG/DL (ref 0.57–1)
ERYTHROCYTE [DISTWIDTH] IN BLOOD BY AUTOMATED COUNT: 13.3 % (ref 12.3–15.4)
GLOBULIN SER CALC-MCNC: 2.3 GM/DL
GLUCOSE SERPL-MCNC: 87 MG/DL (ref 65–99)
HCT VFR BLD AUTO: 43.6 % (ref 34–46.6)
HCV AB S/CO SERPL IA: 0.1 S/CO RATIO (ref 0–0.9)
HDLC SERPL-MCNC: 34 MG/DL (ref 40–60)
HGB BLD-MCNC: 14.8 G/DL (ref 12–15.9)
LDLC SERPL CALC-MCNC: 116 MG/DL (ref 0–100)
MCH RBC QN AUTO: 31.6 PG (ref 26.6–33)
MCHC RBC AUTO-ENTMCNC: 33.9 G/DL (ref 31.5–35.7)
MCV RBC AUTO: 93.2 FL (ref 79–97)
PLATELET # BLD AUTO: 358 10*3/MM3 (ref 140–450)
POTASSIUM SERPL-SCNC: 4.3 MMOL/L (ref 3.5–5.2)
PROT SERPL-MCNC: 6.7 G/DL (ref 6–8.5)
RBC # BLD AUTO: 4.68 10*6/MM3 (ref 3.77–5.28)
SODIUM SERPL-SCNC: 136 MMOL/L (ref 136–145)
TRIGL SERPL-MCNC: 201 MG/DL (ref 0–150)
TSH SERPL DL<=0.005 MIU/L-ACNC: 1.5 UIU/ML (ref 0.27–4.2)
VLDLC SERPL CALC-MCNC: 35 MG/DL (ref 5–40)
WBC # BLD AUTO: 8.06 10*3/MM3 (ref 3.4–10.8)

## 2020-11-24 NOTE — PROGRESS NOTES
Good morning Ms. Latham!  Your labs are back and overall looking stable.  Blood count is stable showing no anemia.  Normal white blood cells.  Metabolic panel is perfect including kidney, liver function and electrolytes.  Total cholesterol is normal.  Triglycerides and LDL cholesterol are both a bit high, please watch dietary intake of fats, carbs and sugars and we will recheck routinely.  Thyroid is normal.  Please let me know of any questions or concerns and we will recheck all levels routinely.  Have a great day,    SUMANTH Blanco

## 2020-11-25 DIAGNOSIS — E66.09 OBESITY DUE TO EXCESS CALORIES, UNSPECIFIED CLASSIFICATION, UNSPECIFIED WHETHER SERIOUS COMORBIDITY PRESENT: Primary | ICD-10-CM

## 2020-12-07 ENCOUNTER — HOSPITAL ENCOUNTER (OUTPATIENT)
Dept: GENERAL RADIOLOGY | Facility: HOSPITAL | Age: 50
Discharge: HOME | End: 2020-12-07
Admitting: SURGERY

## 2020-12-07 DIAGNOSIS — E66.09 OBESITY DUE TO EXCESS CALORIES, UNSPECIFIED CLASSIFICATION, UNSPECIFIED WHETHER SERIOUS COMORBIDITY PRESENT: ICD-10-CM

## 2020-12-07 PROCEDURE — 74021 RADEX ABDOMEN 3+ VIEWS: CPT

## 2020-12-14 ENCOUNTER — TELEPHONE (OUTPATIENT)
Dept: INTERNAL MEDICINE | Facility: CLINIC | Age: 50
End: 2020-12-14

## 2020-12-14 NOTE — TELEPHONE ENCOUNTER
PATIENT CALLED IN AND STATED THAT SHE WOULD LIKE A CALL BACK ABOUT HER LAB RESULTS THAT SHE HAD DONE 11/23/20..    PLEASE ADVISE     CALL BACK [phone]

## 2020-12-16 DIAGNOSIS — L98.9 SKIN LESION: Primary | ICD-10-CM

## 2020-12-16 RX ORDER — CELECOXIB 200 MG/1
200 CAPSULE ORAL 2 TIMES DAILY
Qty: 10 CAPSULE | Refills: 0 | Status: SHIPPED | OUTPATIENT
Start: 2020-12-16 | End: 2020-12-21

## 2020-12-16 RX ORDER — CEPHALEXIN 500 MG/1
500 CAPSULE ORAL 4 TIMES DAILY
Qty: 40 CAPSULE | Refills: 0 | Status: SHIPPED | OUTPATIENT
Start: 2020-12-16 | End: 2020-12-26

## 2020-12-16 RX ORDER — HYDROCODONE BITARTRATE AND ACETAMINOPHEN 10; 325 MG/1; MG/1
1 TABLET ORAL EVERY 6 HOURS PRN
Qty: 30 TABLET | Refills: 0 | Status: SHIPPED | OUTPATIENT
Start: 2020-12-16 | End: 2021-02-12

## 2020-12-16 RX ORDER — PROMETHAZINE HYDROCHLORIDE 12.5 MG/1
12.5 TABLET ORAL EVERY 6 HOURS PRN
Qty: 20 TABLET | Refills: 0 | Status: SHIPPED | OUTPATIENT
Start: 2020-12-16 | End: 2021-02-12

## 2020-12-21 ENCOUNTER — APPOINTMENT (OUTPATIENT)
Dept: PREADMISSION TESTING | Facility: HOSPITAL | Age: 50
End: 2020-12-21

## 2020-12-21 ENCOUNTER — PREP FOR SURGERY (OUTPATIENT)
Dept: OTHER | Facility: HOSPITAL | Age: 50
End: 2020-12-21

## 2020-12-21 VITALS
OXYGEN SATURATION: 99 % | RESPIRATION RATE: 16 BRPM | BODY MASS INDEX: 36.64 KG/M2 | HEART RATE: 98 BPM | TEMPERATURE: 98.3 F | HEIGHT: 66 IN | WEIGHT: 228 LBS | SYSTOLIC BLOOD PRESSURE: 155 MMHG | DIASTOLIC BLOOD PRESSURE: 98 MMHG

## 2020-12-21 DIAGNOSIS — E66.09 OBESITY DUE TO EXCESS CALORIES, UNSPECIFIED CLASSIFICATION, UNSPECIFIED WHETHER SERIOUS COMORBIDITY PRESENT: Primary | ICD-10-CM

## 2020-12-21 PROBLEM — E65 LOCALIZED DEPOSITS OF FAT: Status: ACTIVE | Noted: 2020-12-21

## 2020-12-21 LAB
ANION GAP SERPL CALCULATED.3IONS-SCNC: 10.1 MMOL/L (ref 5–15)
BUN SERPL-MCNC: 10 MG/DL (ref 6–20)
BUN/CREAT SERPL: 12 (ref 7–25)
CALCIUM SPEC-SCNC: 9.6 MG/DL (ref 8.6–10.5)
CHLORIDE SERPL-SCNC: 105 MMOL/L (ref 98–107)
CO2 SERPL-SCNC: 26.9 MMOL/L (ref 22–29)
CREAT SERPL-MCNC: 0.83 MG/DL (ref 0.57–1)
DEPRECATED RDW RBC AUTO: 44 FL (ref 37–54)
ERYTHROCYTE [DISTWIDTH] IN BLOOD BY AUTOMATED COUNT: 13 % (ref 12.3–15.4)
GFR SERPL CREATININE-BSD FRML MDRD: 73 ML/MIN/1.73
GLUCOSE SERPL-MCNC: 95 MG/DL (ref 65–99)
HCT VFR BLD AUTO: 45.4 % (ref 34–46.6)
HGB BLD-MCNC: 15.5 G/DL (ref 12–15.9)
MCH RBC QN AUTO: 31.6 PG (ref 26.6–33)
MCHC RBC AUTO-ENTMCNC: 34.1 G/DL (ref 31.5–35.7)
MCV RBC AUTO: 92.5 FL (ref 79–97)
PLATELET # BLD AUTO: 366 10*3/MM3 (ref 140–450)
PMV BLD AUTO: 9.4 FL (ref 6–12)
POTASSIUM SERPL-SCNC: 4.6 MMOL/L (ref 3.5–5.2)
RBC # BLD AUTO: 4.91 10*6/MM3 (ref 3.77–5.28)
SODIUM SERPL-SCNC: 142 MMOL/L (ref 136–145)
WBC # BLD AUTO: 8.39 10*3/MM3 (ref 3.4–10.8)

## 2020-12-21 PROCEDURE — 36415 COLL VENOUS BLD VENIPUNCTURE: CPT

## 2020-12-21 PROCEDURE — 85027 COMPLETE CBC AUTOMATED: CPT

## 2020-12-21 PROCEDURE — U0004 COV-19 TEST NON-CDC HGH THRU: HCPCS

## 2020-12-21 PROCEDURE — C9803 HOPD COVID-19 SPEC COLLECT: HCPCS

## 2020-12-21 PROCEDURE — 80048 BASIC METABOLIC PNL TOTAL CA: CPT

## 2020-12-21 RX ORDER — CEFAZOLIN SODIUM 2 G/100ML
2 INJECTION, SOLUTION INTRAVENOUS ONCE
Status: CANCELLED | OUTPATIENT
Start: 2020-12-21 | End: 2020-12-21

## 2020-12-21 NOTE — DISCHARGE INSTRUCTIONS
Take the following medications the morning of surgery:  NONE    If you are on prescription narcotic pain medication to control your pain you may also take that medication the morning of surgery.    PLEASE ARRIVE AT THE HOSPITAL AT 5:30 AM ON December 23, 2020    General Instructions:  • Do not eat solid food after midnight the night before surgery.  • You may drink clear liquids day of surgery but must stop at least one hour before your hospital arrival time.  • NOTHING TO DRINK AFTER 4:30 AM  • It is beneficial for you to have a clear drink that contains carbohydrates the day of surgery.  We suggest a 12 to 20 ounce bottle of Gatorade or Powerade for non-diabetic patients or a 12 to 20 ounce bottle of G2 or Powerade Zero for diabetic patients. (Pediatric patients, are not advised to drink a 12 to 20 ounce carbohydrate drink)    Clear liquids are liquids you can see through.  Nothing red in color.     Plain water                               Sports drinks  Sodas                                   Gelatin (Jell-O)  Fruit juices without pulp such as white grape juice and apple juice  Popsicles that contain no fruit or yogurt  Tea or coffee (no cream or milk added)  Gatorade / Powerade  G2 / Powerade Zero    • Infants may have breast milk up to four hours before surgery.  • Infants drinking formula may drink formula up to six hours before surgery.   • Patients who avoid smoking, chewing tobacco and alcohol for 4 weeks prior to surgery have a reduced risk of post-operative complications.  Quit smoking as many days before surgery as you can.  • Do not smoke, use chewing tobacco or drink alcohol the day of surgery.   • If applicable bring your C-PAP/ BI-PAP machine.  • Bring any papers given to you in the doctor’s office.  • Wear clean comfortable clothes.  • Do not wear contact lenses, false eyelashes or make-up.  Bring a case for your glasses.   • Bring crutches or walker if applicable.  • Remove all piercings.  Leave  jewelry and any other valuables at home.  • Hair extensions with metal clips must be removed prior to surgery.  • The Pre-Admission Testing nurse will instruct you to bring medications if unable to obtain an accurate list in Pre-Admission Testing.      Preventing a Surgical Site Infection:  • For 2 to 3 days before surgery, avoid shaving with a razor because the razor can irritate skin and make it easier to develop an infection.    • Any areas of open skin can increase the risk of a post-operative wound infection by allowing bacteria to enter and travel throughout the body.  Notify your surgeon if you have any skin wounds / rashes even if it is not near the expected surgical site.  The area will need assessed to determine if surgery should be delayed until it is healed.  • Sleep in a clean bed with clean clothing.  Do not allow pets to sleep with you.  • Shower on the morning of surgery using a fresh bar of anti-bacterial soap (such as Dial) and clean washcloth.  Dry with a clean towel and dress in clean clothing.  • Ask your surgeon if you will be receiving antibiotics prior to surgery.  • Make sure you, your family, and all healthcare providers clean their hands with soap and water or an alcohol based hand  before caring for you or your wound.    Day of surgery:  Your arrival time is approximately two hours before your scheduled surgery time.  Upon arrival, a Pre-op nurse and Anesthesiologist will review your health history, obtain vital signs, and answer questions you may have.  The only belongings needed at this time will be a list of your home medications and if applicable your C-PAP/BI-PAP machine.  A Pre-op nurse will start an IV and you may receive medication in preparation for surgery, including something to help you relax.     Please be aware that surgery does come with discomfort.  We want to make every effort to control your discomfort so please discuss any uncontrolled symptoms with your nurse.    Your doctor will most likely have prescribed pain medications.      If you are going home after surgery you will receive individualized written care instructions before being discharged.  A responsible adult must drive you to and from the hospital on the day of your surgery and stay with you for 24 hours.  Discharge prescriptions can be filled by the hospital pharmacy during regular pharmacy hours.  If you are having surgery late in the day/evening your prescription may be e-prescribed to your pharmacy.  Please verify your pharmacy hours or chose a 24 hour pharmacy to avoid not having access to your prescription because your pharmacy has closed for the day.    If you are staying overnight following surgery, you will be transported to your hospital room following the recovery period.  [facility] has all private rooms.    If you have any questions please call Pre-Admission Testing at [phone].  Deductibles and co-payments are collected on the day of service. Please be prepared to pay the required co-pay, deductible or deposit on the day of service as defined by your plan.    Patient Education for Self-Quarantine Process    Following your COVID testing, we strongly recommend that you do not leave your home after you have been tested for COVID except to get medical care. This includes not going to work, school or to public areas.  If this is not possible for you to do please limit your activities to only required outings.  Be sure to wear a mask when you are with other people, practice social distancing and wash your hands frequently.      The following items provide additional details to keep you safe.  • Wash your hands with soap and water frequently for at least 20 seconds.   • Avoid touching your eyes, nose and mouth with unwashed hands.  • Do not share anything - utensils, towels, food from the same bowl.   • Have your own utensils, drinking glass, dishes, towels and bedding.   • Do not have  visitors.   • Do use FaceTime to stay in touch with family and friends.  • You should stay in a specific room away from others if possible.   • Stay at least 6 feet away from others in the home if you cannot have a dedicated room to yourself.   • Do not snuggle with your pet. While the CDC says there is no evidence that pets can spread COVID-19 or be infected from humans, it is probably best to avoid “petting, snuggling, being kissed or licked and sharing food (during self-quarantine)”, according to the CDC.   • Sanitize household surfaces daily. Include all high touch areas (door handles, light switches, phones, countertops, etc.)  • Do not share a bathroom with others, if possible.   • Wear a mask around others in your home if you are unable to stay in a separate room or 6 feet apart. If  you are unable to wear a mask, have your family member wear a mask if they must be within 6 feet of you.   Call your surgeon immediately if you experience any of the following symptoms:  • Sore Throat  • Shortness of Breath or difficulty breathing  • Cough  • Chills  • Body soreness or muscle pain  • Headache  • Fever  • New loss of taste or smell  • Do not arrive for your surgery ill.  Your procedure will need to be rescheduled to another time.  You will need to call your physician before the day of surgery to avoid any unnecessary exposure to hospital staff as well as other patients.

## 2020-12-22 LAB — SARS-COV-2 RNA RESP QL NAA+PROBE: NOT DETECTED

## 2020-12-23 ENCOUNTER — ANESTHESIA (OUTPATIENT)
Dept: PERIOP | Facility: HOSPITAL | Age: 50
End: 2020-12-23

## 2020-12-23 ENCOUNTER — ANESTHESIA EVENT (OUTPATIENT)
Dept: PERIOP | Facility: HOSPITAL | Age: 50
End: 2020-12-23

## 2020-12-23 ENCOUNTER — HOSPITAL ENCOUNTER (OUTPATIENT)
Facility: HOSPITAL | Age: 50
Discharge: HOME | End: 2020-12-24
Attending: SURGERY | Admitting: SURGERY

## 2020-12-23 DIAGNOSIS — E66.09 OBESITY DUE TO EXCESS CALORIES, UNSPECIFIED CLASSIFICATION, UNSPECIFIED WHETHER SERIOUS COMORBIDITY PRESENT: ICD-10-CM

## 2020-12-23 DIAGNOSIS — M72.2 PLANTAR FASCIITIS: ICD-10-CM

## 2020-12-23 PROCEDURE — 25010000002 NEOSTIGMINE PER 0.5 MG: Performed by: NURSE ANESTHETIST, CERTIFIED REGISTERED

## 2020-12-23 PROCEDURE — G0378 HOSPITAL OBSERVATION PER HR: HCPCS

## 2020-12-23 PROCEDURE — 25010000002 HYDROMORPHONE PER 4 MG: Performed by: NURSE ANESTHETIST, CERTIFIED REGISTERED

## 2020-12-23 PROCEDURE — 25010000002 EPINEPHRINE PER 0.1 MG: Performed by: SURGERY

## 2020-12-23 PROCEDURE — 25010000002 HYDROMORPHONE 1 MG/ML SOLUTION: Performed by: SURGERY

## 2020-12-23 PROCEDURE — 25010000002 DEXAMETHASONE PER 1 MG: Performed by: NURSE ANESTHETIST, CERTIFIED REGISTERED

## 2020-12-23 PROCEDURE — 25010000002 ONDANSETRON PER 1 MG: Performed by: NURSE ANESTHETIST, CERTIFIED REGISTERED

## 2020-12-23 PROCEDURE — 25010000002 FENTANYL CITRATE (PF) 100 MCG/2ML SOLUTION: Performed by: NURSE ANESTHETIST, CERTIFIED REGISTERED

## 2020-12-23 PROCEDURE — 25010000002 PHENYLEPHRINE PER 1 ML: Performed by: NURSE ANESTHETIST, CERTIFIED REGISTERED

## 2020-12-23 PROCEDURE — 25010000002 MIDAZOLAM PER 1 MG: Performed by: ANESTHESIOLOGY

## 2020-12-23 PROCEDURE — 25010000002 PROPOFOL 10 MG/ML EMULSION: Performed by: NURSE ANESTHETIST, CERTIFIED REGISTERED

## 2020-12-23 PROCEDURE — 25010000002 FENTANYL CITRATE (PF) 100 MCG/2ML SOLUTION: Performed by: ANESTHESIOLOGY

## 2020-12-23 PROCEDURE — 25010000002 CEFAZOLIN 1-4 GM/50ML-% SOLUTION: Performed by: SURGERY

## 2020-12-23 PROCEDURE — 25010000003 CEFAZOLIN IN DEXTROSE 2-4 GM/100ML-% SOLUTION: Performed by: SURGERY

## 2020-12-23 RX ORDER — BUPIVACAINE HYDROCHLORIDE AND EPINEPHRINE 2.5; 5 MG/ML; UG/ML
INJECTION, SOLUTION INFILTRATION; PERINEURAL AS NEEDED
Status: DISCONTINUED | OUTPATIENT
Start: 2020-12-23 | End: 2020-12-23 | Stop reason: HOSPADM

## 2020-12-23 RX ORDER — CEFAZOLIN SODIUM 2 G/100ML
2 INJECTION, SOLUTION INTRAVENOUS ONCE
Status: COMPLETED | OUTPATIENT
Start: 2020-12-23 | End: 2020-12-23

## 2020-12-23 RX ORDER — PROMETHAZINE HYDROCHLORIDE 12.5 MG/1
12.5 SUPPOSITORY RECTAL EVERY 6 HOURS PRN
Status: DISCONTINUED | OUTPATIENT
Start: 2020-12-23 | End: 2020-12-24 | Stop reason: HOSPADM

## 2020-12-23 RX ORDER — ROCURONIUM BROMIDE 10 MG/ML
INJECTION, SOLUTION INTRAVENOUS AS NEEDED
Status: DISCONTINUED | OUTPATIENT
Start: 2020-12-23 | End: 2020-12-23 | Stop reason: SURG

## 2020-12-23 RX ORDER — ONDANSETRON 2 MG/ML
4 INJECTION INTRAMUSCULAR; INTRAVENOUS ONCE AS NEEDED
Status: DISCONTINUED | OUTPATIENT
Start: 2020-12-23 | End: 2020-12-23 | Stop reason: HOSPADM

## 2020-12-23 RX ORDER — CELECOXIB 200 MG/1
200 CAPSULE ORAL DAILY
COMMUNITY
End: 2021-02-12

## 2020-12-23 RX ORDER — LIDOCAINE HYDROCHLORIDE 20 MG/ML
2 INJECTION, SOLUTION INFILTRATION; PERINEURAL ONCE
Status: DISCONTINUED | OUTPATIENT
Start: 2020-12-23 | End: 2020-12-24 | Stop reason: HOSPADM

## 2020-12-23 RX ORDER — PROMETHAZINE HYDROCHLORIDE 25 MG/1
25 TABLET ORAL ONCE AS NEEDED
Status: DISCONTINUED | OUTPATIENT
Start: 2020-12-23 | End: 2020-12-23 | Stop reason: HOSPADM

## 2020-12-23 RX ORDER — SODIUM CHLORIDE 0.9 % (FLUSH) 0.9 %
3-10 SYRINGE (ML) INJECTION AS NEEDED
Status: DISCONTINUED | OUTPATIENT
Start: 2020-12-23 | End: 2020-12-23 | Stop reason: HOSPADM

## 2020-12-23 RX ORDER — DIPHENHYDRAMINE HYDROCHLORIDE 50 MG/ML
12.5 INJECTION INTRAMUSCULAR; INTRAVENOUS
Status: DISCONTINUED | OUTPATIENT
Start: 2020-12-23 | End: 2020-12-23 | Stop reason: HOSPADM

## 2020-12-23 RX ORDER — DEXMEDETOMIDINE HYDROCHLORIDE 100 UG/ML
INJECTION, SOLUTION INTRAVENOUS AS NEEDED
Status: DISCONTINUED | OUTPATIENT
Start: 2020-12-23 | End: 2020-12-23 | Stop reason: SURG

## 2020-12-23 RX ORDER — HYDROMORPHONE HCL 110MG/55ML
PATIENT CONTROLLED ANALGESIA SYRINGE INTRAVENOUS AS NEEDED
Status: DISCONTINUED | OUTPATIENT
Start: 2020-12-23 | End: 2020-12-23 | Stop reason: SURG

## 2020-12-23 RX ORDER — FAMOTIDINE 10 MG/ML
20 INJECTION, SOLUTION INTRAVENOUS ONCE
Status: COMPLETED | OUTPATIENT
Start: 2020-12-23 | End: 2020-12-23

## 2020-12-23 RX ORDER — NALOXONE HCL 0.4 MG/ML
0.4 VIAL (ML) INJECTION
Status: DISCONTINUED | OUTPATIENT
Start: 2020-12-23 | End: 2020-12-24 | Stop reason: HOSPADM

## 2020-12-23 RX ORDER — MORPHINE SULFATE 2 MG/ML
4 INJECTION, SOLUTION INTRAMUSCULAR; INTRAVENOUS
Status: DISCONTINUED | OUTPATIENT
Start: 2020-12-23 | End: 2020-12-24 | Stop reason: HOSPADM

## 2020-12-23 RX ORDER — PROMETHAZINE HYDROCHLORIDE 25 MG/1
25 SUPPOSITORY RECTAL ONCE AS NEEDED
Status: DISCONTINUED | OUTPATIENT
Start: 2020-12-23 | End: 2020-12-23 | Stop reason: HOSPADM

## 2020-12-23 RX ORDER — ONDANSETRON 2 MG/ML
INJECTION INTRAMUSCULAR; INTRAVENOUS AS NEEDED
Status: DISCONTINUED | OUTPATIENT
Start: 2020-12-23 | End: 2020-12-23 | Stop reason: SURG

## 2020-12-23 RX ORDER — HYDROCODONE BITARTRATE AND ACETAMINOPHEN 7.5; 325 MG/1; MG/1
1 TABLET ORAL ONCE AS NEEDED
Status: DISCONTINUED | OUTPATIENT
Start: 2020-12-23 | End: 2020-12-23 | Stop reason: HOSPADM

## 2020-12-23 RX ORDER — FENTANYL CITRATE 50 UG/ML
50 INJECTION, SOLUTION INTRAMUSCULAR; INTRAVENOUS
Status: DISCONTINUED | OUTPATIENT
Start: 2020-12-23 | End: 2020-12-23 | Stop reason: HOSPADM

## 2020-12-23 RX ORDER — ACETAMINOPHEN 325 MG/1
650 TABLET ORAL EVERY 4 HOURS PRN
Status: DISCONTINUED | OUTPATIENT
Start: 2020-12-23 | End: 2020-12-24 | Stop reason: HOSPADM

## 2020-12-23 RX ORDER — HYDROMORPHONE HYDROCHLORIDE 1 MG/ML
0.5 INJECTION, SOLUTION INTRAMUSCULAR; INTRAVENOUS; SUBCUTANEOUS
Status: DISCONTINUED | OUTPATIENT
Start: 2020-12-23 | End: 2020-12-23 | Stop reason: HOSPADM

## 2020-12-23 RX ORDER — CEFAZOLIN SODIUM 1 G/50ML
1 INJECTION, SOLUTION INTRAVENOUS EVERY 8 HOURS
Status: DISCONTINUED | OUTPATIENT
Start: 2020-12-23 | End: 2020-12-24 | Stop reason: HOSPADM

## 2020-12-23 RX ORDER — NALOXONE HCL 0.4 MG/ML
0.2 VIAL (ML) INJECTION AS NEEDED
Status: DISCONTINUED | OUTPATIENT
Start: 2020-12-23 | End: 2020-12-23 | Stop reason: HOSPADM

## 2020-12-23 RX ORDER — FLUMAZENIL 0.1 MG/ML
0.2 INJECTION INTRAVENOUS AS NEEDED
Status: DISCONTINUED | OUTPATIENT
Start: 2020-12-23 | End: 2020-12-23 | Stop reason: HOSPADM

## 2020-12-23 RX ORDER — GLYCOPYRROLATE 0.2 MG/ML
INJECTION INTRAMUSCULAR; INTRAVENOUS AS NEEDED
Status: DISCONTINUED | OUTPATIENT
Start: 2020-12-23 | End: 2020-12-23 | Stop reason: SURG

## 2020-12-23 RX ORDER — MIDAZOLAM HYDROCHLORIDE 1 MG/ML
1 INJECTION INTRAMUSCULAR; INTRAVENOUS
Status: DISCONTINUED | OUTPATIENT
Start: 2020-12-23 | End: 2020-12-23 | Stop reason: HOSPADM

## 2020-12-23 RX ORDER — ACETAMINOPHEN 650 MG/1
650 SUPPOSITORY RECTAL EVERY 4 HOURS PRN
Status: DISCONTINUED | OUTPATIENT
Start: 2020-12-23 | End: 2020-12-24 | Stop reason: HOSPADM

## 2020-12-23 RX ORDER — DEXAMETHASONE SODIUM PHOSPHATE 10 MG/ML
INJECTION INTRAMUSCULAR; INTRAVENOUS AS NEEDED
Status: DISCONTINUED | OUTPATIENT
Start: 2020-12-23 | End: 2020-12-23 | Stop reason: SURG

## 2020-12-23 RX ORDER — KETAMINE HYDROCHLORIDE 10 MG/ML
INJECTION INTRAMUSCULAR; INTRAVENOUS AS NEEDED
Status: DISCONTINUED | OUTPATIENT
Start: 2020-12-23 | End: 2020-12-23 | Stop reason: SURG

## 2020-12-23 RX ORDER — EPHEDRINE SULFATE 50 MG/ML
5 INJECTION, SOLUTION INTRAVENOUS ONCE AS NEEDED
Status: DISCONTINUED | OUTPATIENT
Start: 2020-12-23 | End: 2020-12-23 | Stop reason: HOSPADM

## 2020-12-23 RX ORDER — LIDOCAINE HYDROCHLORIDE 20 MG/ML
INJECTION, SOLUTION INFILTRATION; PERINEURAL AS NEEDED
Status: DISCONTINUED | OUTPATIENT
Start: 2020-12-23 | End: 2020-12-23 | Stop reason: SURG

## 2020-12-23 RX ORDER — LIDOCAINE HYDROCHLORIDE 10 MG/ML
0.5 INJECTION, SOLUTION EPIDURAL; INFILTRATION; INTRACAUDAL; PERINEURAL ONCE AS NEEDED
Status: DISCONTINUED | OUTPATIENT
Start: 2020-12-23 | End: 2020-12-23 | Stop reason: HOSPADM

## 2020-12-23 RX ORDER — OXYCODONE AND ACETAMINOPHEN 7.5; 325 MG/1; MG/1
1 TABLET ORAL ONCE AS NEEDED
Status: DISCONTINUED | OUTPATIENT
Start: 2020-12-23 | End: 2020-12-23 | Stop reason: HOSPADM

## 2020-12-23 RX ORDER — PROMETHAZINE HYDROCHLORIDE 12.5 MG/1
12.5 TABLET ORAL EVERY 6 HOURS PRN
Status: DISCONTINUED | OUTPATIENT
Start: 2020-12-23 | End: 2020-12-24 | Stop reason: HOSPADM

## 2020-12-23 RX ORDER — NALOXONE HCL 0.4 MG/ML
0.1 VIAL (ML) INJECTION
Status: DISCONTINUED | OUTPATIENT
Start: 2020-12-23 | End: 2020-12-24 | Stop reason: HOSPADM

## 2020-12-23 RX ORDER — CELECOXIB 200 MG/1
200 CAPSULE ORAL DAILY
Status: DISCONTINUED | OUTPATIENT
Start: 2020-12-23 | End: 2020-12-24 | Stop reason: HOSPADM

## 2020-12-23 RX ORDER — AMOXICILLIN 250 MG
2 CAPSULE ORAL 2 TIMES DAILY PRN
Status: DISCONTINUED | OUTPATIENT
Start: 2020-12-23 | End: 2020-12-24 | Stop reason: HOSPADM

## 2020-12-23 RX ORDER — CYCLOBENZAPRINE HCL 10 MG
5 TABLET ORAL NIGHTLY PRN
Status: DISCONTINUED | OUTPATIENT
Start: 2020-12-23 | End: 2020-12-24 | Stop reason: HOSPADM

## 2020-12-23 RX ORDER — LABETALOL HYDROCHLORIDE 5 MG/ML
5 INJECTION, SOLUTION INTRAVENOUS
Status: DISCONTINUED | OUTPATIENT
Start: 2020-12-23 | End: 2020-12-23 | Stop reason: HOSPADM

## 2020-12-23 RX ORDER — DIPHENHYDRAMINE HCL 25 MG
25 CAPSULE ORAL
Status: DISCONTINUED | OUTPATIENT
Start: 2020-12-23 | End: 2020-12-23 | Stop reason: HOSPADM

## 2020-12-23 RX ORDER — HYDROCODONE BITARTRATE AND ACETAMINOPHEN 10; 325 MG/1; MG/1
1 TABLET ORAL EVERY 6 HOURS PRN
Status: DISCONTINUED | OUTPATIENT
Start: 2020-12-23 | End: 2020-12-24 | Stop reason: HOSPADM

## 2020-12-23 RX ORDER — SODIUM CHLORIDE, SODIUM LACTATE, POTASSIUM CHLORIDE, CALCIUM CHLORIDE 600; 310; 30; 20 MG/100ML; MG/100ML; MG/100ML; MG/100ML
9 INJECTION, SOLUTION INTRAVENOUS CONTINUOUS
Status: DISCONTINUED | OUTPATIENT
Start: 2020-12-23 | End: 2020-12-24

## 2020-12-23 RX ORDER — DEXTROSE, SODIUM CHLORIDE, AND POTASSIUM CHLORIDE 5; .45; .15 G/100ML; G/100ML; G/100ML
75 INJECTION INTRAVENOUS CONTINUOUS
Status: DISCONTINUED | OUTPATIENT
Start: 2020-12-23 | End: 2020-12-24

## 2020-12-23 RX ORDER — PROPOFOL 10 MG/ML
VIAL (ML) INTRAVENOUS AS NEEDED
Status: DISCONTINUED | OUTPATIENT
Start: 2020-12-23 | End: 2020-12-23 | Stop reason: SURG

## 2020-12-23 RX ORDER — SODIUM CHLORIDE 0.9 % (FLUSH) 0.9 %
3 SYRINGE (ML) INJECTION EVERY 12 HOURS SCHEDULED
Status: DISCONTINUED | OUTPATIENT
Start: 2020-12-23 | End: 2020-12-23 | Stop reason: HOSPADM

## 2020-12-23 RX ADMIN — PROPOFOL 150 MG: 10 INJECTION, EMULSION INTRAVENOUS at 07:43

## 2020-12-23 RX ADMIN — SODIUM CHLORIDE, POTASSIUM CHLORIDE, SODIUM LACTATE AND CALCIUM CHLORIDE 9 ML/HR: 600; 310; 30; 20 INJECTION, SOLUTION INTRAVENOUS at 06:56

## 2020-12-23 RX ADMIN — FENTANYL CITRATE 50 MCG: 50 INJECTION, SOLUTION INTRAMUSCULAR; INTRAVENOUS at 13:35

## 2020-12-23 RX ADMIN — CEFAZOLIN SODIUM 2 G: 2 INJECTION, SOLUTION INTRAVENOUS at 12:01

## 2020-12-23 RX ADMIN — KETAMINE HYDROCHLORIDE 20 MG: 10 INJECTION INTRAMUSCULAR; INTRAVENOUS at 07:43

## 2020-12-23 RX ADMIN — NEOSTIGMINE METHYLSULFATE 4 MG: 1 INJECTION INTRAMUSCULAR; INTRAVENOUS; SUBCUTANEOUS at 12:34

## 2020-12-23 RX ADMIN — FENTANYL CITRATE 50 MCG: 50 INJECTION, SOLUTION INTRAMUSCULAR; INTRAVENOUS at 13:24

## 2020-12-23 RX ADMIN — MIDAZOLAM 2 MG: 1 INJECTION INTRAMUSCULAR; INTRAVENOUS at 08:24

## 2020-12-23 RX ADMIN — POTASSIUM CHLORIDE, DEXTROSE MONOHYDRATE AND SODIUM CHLORIDE 75 ML/HR: 150; 5; 450 INJECTION, SOLUTION INTRAVENOUS at 17:10

## 2020-12-23 RX ADMIN — HYDROMORPHONE HYDROCHLORIDE 0.5 MG: 1 INJECTION, SOLUTION INTRAMUSCULAR; INTRAVENOUS; SUBCUTANEOUS at 14:14

## 2020-12-23 RX ADMIN — ONDANSETRON HYDROCHLORIDE 4 MG: 2 SOLUTION INTRAMUSCULAR; INTRAVENOUS at 12:27

## 2020-12-23 RX ADMIN — PHENYLEPHRINE HYDROCHLORIDE 100 MCG: 10 INJECTION INTRAVENOUS at 08:20

## 2020-12-23 RX ADMIN — ROCURONIUM BROMIDE 10 MG: 10 INJECTION INTRAVENOUS at 10:18

## 2020-12-23 RX ADMIN — HYDROMORPHONE HYDROCHLORIDE 0.5 MG: 1 INJECTION, SOLUTION INTRAMUSCULAR; INTRAVENOUS; SUBCUTANEOUS at 13:13

## 2020-12-23 RX ADMIN — GLYCOPYRROLATE 0.3 MG: 0.2 INJECTION INTRAMUSCULAR; INTRAVENOUS at 12:34

## 2020-12-23 RX ADMIN — GLYCOPYRROLATE 0.2 MG: 0.2 INJECTION INTRAMUSCULAR; INTRAVENOUS at 08:02

## 2020-12-23 RX ADMIN — PHENYLEPHRINE HYDROCHLORIDE 100 MCG: 10 INJECTION INTRAVENOUS at 10:45

## 2020-12-23 RX ADMIN — PROPOFOL 150 MG: 10 INJECTION, EMULSION INTRAVENOUS at 07:47

## 2020-12-23 RX ADMIN — DEXMEDETOMIDINE HYDROCHLORIDE 10 MCG: 100 INJECTION, SOLUTION, CONCENTRATE INTRAVENOUS at 09:09

## 2020-12-23 RX ADMIN — ROCURONIUM BROMIDE 15 MG: 10 INJECTION INTRAVENOUS at 09:16

## 2020-12-23 RX ADMIN — DEXMEDETOMIDINE HYDROCHLORIDE 10 MCG: 100 INJECTION, SOLUTION, CONCENTRATE INTRAVENOUS at 08:11

## 2020-12-23 RX ADMIN — CEFAZOLIN SODIUM 1 G: 1 INJECTION, SOLUTION INTRAVENOUS at 20:34

## 2020-12-23 RX ADMIN — CEFAZOLIN SODIUM 2 G: 2 INJECTION, SOLUTION INTRAVENOUS at 07:54

## 2020-12-23 RX ADMIN — HYDROMORPHONE HYDROCHLORIDE 0.5 MG: 2 INJECTION, SOLUTION INTRAMUSCULAR; INTRAVENOUS; SUBCUTANEOUS at 11:36

## 2020-12-23 RX ADMIN — HYDROCODONE BITARTRATE AND ACETAMINOPHEN 1 TABLET: 10; 325 TABLET ORAL at 14:24

## 2020-12-23 RX ADMIN — ROCURONIUM BROMIDE 10 MG: 10 INJECTION INTRAVENOUS at 11:16

## 2020-12-23 RX ADMIN — PHENYLEPHRINE HYDROCHLORIDE 100 MCG: 10 INJECTION INTRAVENOUS at 10:54

## 2020-12-23 RX ADMIN — HYDROMORPHONE HYDROCHLORIDE 1 MG: 1 INJECTION, SOLUTION INTRAMUSCULAR; INTRAVENOUS; SUBCUTANEOUS at 17:10

## 2020-12-23 RX ADMIN — DEXAMETHASONE SODIUM PHOSPHATE 8 MG: 10 INJECTION INTRAMUSCULAR; INTRAVENOUS at 08:11

## 2020-12-23 RX ADMIN — FENTANYL CITRATE 100 MCG: 50 INJECTION INTRAMUSCULAR; INTRAVENOUS at 07:42

## 2020-12-23 RX ADMIN — SODIUM CHLORIDE, POTASSIUM CHLORIDE, SODIUM LACTATE AND CALCIUM CHLORIDE: 600; 310; 30; 20 INJECTION, SOLUTION INTRAVENOUS at 10:55

## 2020-12-23 RX ADMIN — HYDROMORPHONE HYDROCHLORIDE 0.5 MG: 2 INJECTION, SOLUTION INTRAMUSCULAR; INTRAVENOUS; SUBCUTANEOUS at 10:59

## 2020-12-23 RX ADMIN — HYDROMORPHONE HYDROCHLORIDE 0.5 MG: 1 INJECTION, SOLUTION INTRAMUSCULAR; INTRAVENOUS; SUBCUTANEOUS at 13:40

## 2020-12-23 RX ADMIN — FENTANYL CITRATE 50 MCG: 50 INJECTION, SOLUTION INTRAMUSCULAR; INTRAVENOUS at 13:04

## 2020-12-23 RX ADMIN — PHENYLEPHRINE HYDROCHLORIDE 100 MCG: 10 INJECTION INTRAVENOUS at 09:16

## 2020-12-23 RX ADMIN — HYDROCODONE BITARTRATE AND ACETAMINOPHEN 1 TABLET: 10; 325 TABLET ORAL at 18:52

## 2020-12-23 RX ADMIN — FAMOTIDINE 20 MG: 10 INJECTION INTRAVENOUS at 06:56

## 2020-12-23 RX ADMIN — FENTANYL CITRATE 50 MCG: 50 INJECTION, SOLUTION INTRAMUSCULAR; INTRAVENOUS at 13:54

## 2020-12-23 RX ADMIN — ROCURONIUM BROMIDE 50 MG: 10 INJECTION INTRAVENOUS at 07:47

## 2020-12-23 RX ADMIN — LIDOCAINE HYDROCHLORIDE 100 MG: 20 INJECTION, SOLUTION INFILTRATION; PERINEURAL at 07:43

## 2020-12-23 RX ADMIN — HYDROMORPHONE HYDROCHLORIDE 0.5 MG: 1 INJECTION, SOLUTION INTRAMUSCULAR; INTRAVENOUS; SUBCUTANEOUS at 13:30

## 2020-12-23 RX ADMIN — SODIUM CHLORIDE, POTASSIUM CHLORIDE, SODIUM LACTATE AND CALCIUM CHLORIDE: 600; 310; 30; 20 INJECTION, SOLUTION INTRAVENOUS at 12:24

## 2020-12-23 NOTE — OP NOTE
Plastic Surgery Op Note    ABDOMINAL AND BACK LIPOSUCTION , BILATERAL THIGH LIFT      Valerie Latham  12/23/2020    Pre-op Diagnosis:   Localized fat    Post-Op Diagnosis Codes:   the same    Anesthesia: General    Staff:   Surgeon: Dr Garcia  1st Assistant: Dr Henry    Circulator: Theresa Gonzalez, REGINA; Shahla Whitaker RN; Gill Olivarez, RN  Scrub Person: Doug Randle; Rajendra Willard  [place]ee: Jorge Argueta RN    Estimated Blood Loss: 200ml    Specimens: * No orders in the log *      Drains:   Urethral Catheter Silicone 16 Fr. (Active)   Daily Indications Required activity restriction from trauma, surgery, (e.g. unstable spine, fracture, hemodynamics) 12/23/20 1246   Site Assessment Clean;Skin intact 12/23/20 1246   Collection Container Standard drainage bag 12/23/20 1246   Securement Method Securing device 12/23/20 1246       Findings:   Tumescent: posterior: 2L            Anterior: 2L    Lipoaspirate: anterior: 2775mL                        Posterior: 4150 mL    Thigh plasty: R: 262g                       L: 328g    Complications: none  Drains: none       Date: 12/23/2020  Time: 14:17 EST    After risks and benefits were discussed with patient and inform consent was signed, patient was taken to the OR and positioned supine. Then patient was anesthetized using general anesthesia and re positioned prone on the or table. Then the back and thighs were prepped in a sterile fashion way. A time out confirming the procedure to be performed and patient's name was performed. The whole team was introduced by name.  We infused 2 L of tumescent to the above areas and with the help of 2 liposuction machines, 4150cc of lipoaspirate was obtained. Then patient was flipped on the table, the legs were placed on  Stirrups and the abdomen and thigh were prepped in a sterile fashion way. aprox 2 L of tumescent was infiltrated on the abdomen and thighs that were then lipoaspirate. The lipo on the  thigh was a mean to easily perform a thigh lift without injuring the lymphatic system. After lipoaspiration was performed, incisions on the inguinal creases were performed addressing the skin and subcutaneous that were distally  Elevated. This dissection was performed on a superficial plane. Once adequate amount of skin was elevated. The excess of skin was resected. The incisions were closed using interrupted 2-0 PDS followed by subdermal 3-0 vicryl and superficial running 4-0 monocryl on the most inner part ot the tight and staples on the inguinal areas. All incisions were closed with sterile dressing.     At the end of the procedure, all instruments were checked. There were no complications and I was present for the entire procedure  Narendra Garcia MD PhD  12/23/20  14:17 EST

## 2020-12-23 NOTE — ANESTHESIA PROCEDURE NOTES
Airway  Urgency: elective    Date/Time: 12/23/2020 7:49 AM  Airway not difficult    General Information and Staff    Patient location during procedure: OR  CRNA: Nba Hinojosa CRNA    Indications and Patient Condition  Indications for airway management: airway protection    Preoxygenated: yes  MILS maintained throughout  Mask difficulty assessment: 1 - vent by mask    Final Airway Details  Final airway type: endotracheal airway      Successful airway: ETT  Cuffed: yes   Successful intubation technique: direct laryngoscopy  Endotracheal tube insertion site: oral  Blade: Neftali  Blade size: 3  ETT size (mm): 7.0  Cormack-Lehane Classification: grade I - full view of glottis  Placement verified by: chest auscultation   Measured from: teeth  ETT/EBT  to teeth (cm): 22  Number of attempts at approach: 1  Assessment: lips, teeth, and gum same as pre-op and atraumatic intubation

## 2020-12-23 NOTE — H&P
Plastic  Surgery H&P    Patient Identification:  Name: Valerie Latham  Age: 50 y.o.  Sex: female  :  1970  MRN: [id number]                       Chief Complaint:  Localized  adiposity    History of Present Illness:   Patient is a 49 yo lady sp gastric band with some weight loss, however she still has fat deposits over her back abdomen and thighs. She comes today for her first stage procedure with liposuction of the back, abdomen and stacie thigh lift.    Past Medical History:   Diagnosis Date   • Back pain    • Chronic sinusitis of both maxillary sinuses    • Earache    • Fatigue    • Headache    • History of bronchitis    • History of chickenpox    • Hypertension    • Palpitation    • Torn ligament     left ankle   • Wears glasses        Past Surgical History:   Procedure Laterality Date   • HYSTERECTOMY     • HYSTERECTOMY     • LAPAROSCOPIC GASTRIC BANDING     • LIPOMA EXCISION     • MYOMECTOMY          Facility-Administered Medications Prior to Admission   Medication Dose Route Frequency Provider Last Rate Last Admin   • lidocaine (XYLOCAINE) 2% injection 2 mL  2 mL Infiltration Once Tavares Villa MD         Medications Prior to Admission   Medication Sig Dispense Refill Last Dose   • celecoxib (CeleBREX) 200 MG capsule Take 200 mg by mouth Daily.   2020 at 0530   • cephalexin (KEFLEX) 500 MG capsule Take 1 capsule by mouth 4 (Four) Times a Day for 10 days. (Patient taking differently: Take 500 mg by mouth 4 (Four) Times a Day. POST OP) 40 capsule 0 2020 at 0530   • cyclobenzaprine (FLEXERIL) 5 MG tablet Take 1 tablet by mouth 3 (Three) Times a Day As Needed for Muscle Spasms. (Patient taking differently: Take 5 mg by mouth At Night As Needed for Muscle Spasms.) 30 tablet 2 2020 at 2300   • HYDROcodone-acetaminophen (NORCO)  MG per tablet Take 1 tablet by mouth Every 6 (Six) Hours As Needed for Moderate Pain . (Patient taking differently: Take 1 tablet by mouth Every 6  "(Six) Hours As Needed for Moderate Pain . POST OP) 30 tablet 0    • promethazine (PHENERGAN) 12.5 MG tablet Take 1 tablet by mouth Every 6 (Six) Hours As Needed for Nausea or Vomiting. 20 tablet 0        No Known Allergies    Social History     Tobacco Use   • Smoking status: Never Smoker   • Smokeless tobacco: Never Used   Substance Use Topics   • Alcohol use: Yes     Frequency: Monthly or less     Comment: OCC        Family History   Problem Relation Age of Onset   • Malig Hyperthermia Neg Hx         Review of Systems  All reviewed and negative except above    Objective:  Blood pressure 150/95, pulse 81, temperature 98.4 °F (36.9 °C), temperature source Oral, resp. rate 19, height 165.1 cm (65\"), weight 102 kg (225 lb 3.2 oz), SpO2 99 %.      Exam:  General: alert, cooperative and no distress  Head: normocephalic, without obvious abnormality, atraumatic  Eyes: EOMI, no icterus  Neck: no JVD and supple, symmetrical, trachea midline  Lungs: clear to auscultation bilaterally  Heart: normal rate, regular rhythm   Abdomen: soft, non-distended, non-tender, no peritoneal signs, no masses  Extremities: normal, atraumatic, no cyanosis or edema  Skin: color, texture, turgor normal, no rashes or lesions    All labs (24hrs): No results found for this or any previous visit (from the past 24 hour(s)).         Assessment:  51 yo lady with localized adiposity    Plan:    Lipo of the back, abdomen and stacie thigh lift   Naerndra Garcia MD PhD    12/23/2020      "

## 2020-12-23 NOTE — ANESTHESIA POSTPROCEDURE EVALUATION
Patient: Valerie Latham    Procedure Summary     Date: 12/23/20 Room / Location: [facility] OR  / [facility] MAIN OR    Anesthesia Start: 0736 Anesthesia Stop: 1251    Procedures:       LIPOSUCTION ABDOMEN, BACK, BRA FAT, MONS PUBIS (Bilateral Abdomen)      BILATERAL THIGHPLASTY (Bilateral Thigh) Diagnosis:     Surgeon: Narendra Garcia MD PhD Provider: Edwin Barrett MD    Anesthesia Type: general ASA Status: 3          Anesthesia Type: general    Vitals  Vitals Value Taken Time   /94 12/23/20 1445   Temp 36.6 °C (97.9 °F) 12/23/20 1246   Pulse 67 12/23/20 1449   Resp 18 12/23/20 1445   SpO2 99 % 12/23/20 1449   Vitals shown include unvalidated device data.        Post Anesthesia Care and Evaluation    Patient location during evaluation: PACU  Patient participation: complete - patient participated  Level of consciousness: awake and alert  Pain management: adequate  Airway patency: patent  PONV Status: none  Cardiovascular status: acceptable and hemodynamically stable  Respiratory status: acceptable  Hydration status: acceptable

## 2020-12-23 NOTE — ANESTHESIA PREPROCEDURE EVALUATION
Anesthesia Evaluation     Patient summary reviewed and Nursing notes reviewed                Airway   Mallampati: III  TM distance: >3 FB  Neck ROM: full  Possible difficult intubation  Dental - normal exam     Pulmonary - normal exam   Cardiovascular - normal exam    (+) hypertension less than 2 medications,       Neuro/Psych  (+) headaches,     GI/Hepatic/Renal/Endo    (+) obesity, morbid obesity,      ROS Comment: Hx lap banding    Musculoskeletal     (+) back pain,   Abdominal   (+) obese,    Substance History      OB/GYN          Other                        Anesthesia Plan    ASA 3     general   (Pt woke up during a previous GA, very concerned about that happening, consider using a BIS monitor at least to start case)  intravenous induction     Anesthetic plan, all risks, benefits, and alternatives have been provided, discussed and informed consent has been obtained with: patient.    Plan discussed with CRNA.       Normal rate, regular rhythm, normal S1, S2 heart sounds heard.

## 2020-12-24 ENCOUNTER — READMISSION MANAGEMENT (OUTPATIENT)
Dept: CALL CENTER | Facility: HOSPITAL | Age: 50
End: 2020-12-24

## 2020-12-24 VITALS
BODY MASS INDEX: 37.52 KG/M2 | RESPIRATION RATE: 18 BRPM | OXYGEN SATURATION: 97 % | HEIGHT: 65 IN | DIASTOLIC BLOOD PRESSURE: 68 MMHG | WEIGHT: 225.2 LBS | TEMPERATURE: 96.9 F | HEART RATE: 70 BPM | SYSTOLIC BLOOD PRESSURE: 121 MMHG

## 2020-12-24 PROCEDURE — G0378 HOSPITAL OBSERVATION PER HR: HCPCS

## 2020-12-24 PROCEDURE — 25010000002 CEFAZOLIN 1-4 GM/50ML-% SOLUTION: Performed by: SURGERY

## 2020-12-24 RX ADMIN — HYDROCODONE BITARTRATE AND ACETAMINOPHEN 1 TABLET: 10; 325 TABLET ORAL at 06:56

## 2020-12-24 RX ADMIN — HYDROCODONE BITARTRATE AND ACETAMINOPHEN 1 TABLET: 10; 325 TABLET ORAL at 01:02

## 2020-12-24 RX ADMIN — CELECOXIB 200 MG: 200 CAPSULE ORAL at 08:11

## 2020-12-24 RX ADMIN — CEFAZOLIN SODIUM 1 G: 1 INJECTION, SOLUTION INTRAVENOUS at 03:57

## 2020-12-24 NOTE — DISCHARGE INSTRUCTIONS
Ok for regular diet  No lifting more than 20 lb for next 2 weeks  Ok to remove dressings tomorrow and shower  Ok to sit, avoid opening the legs , ok to clean the pelvic area with warm  water after using the commode

## 2020-12-24 NOTE — DISCHARGE SUMMARY
PLASTIC SURGERY PROGRESS NOTE    Patient Identification:  Name: Valerie Latham    Age: 50 y.o.    Sex: female   :  1970  MRN: [id number]               Subjective:  No acute events.    Objective:    Continuous Infusions:dextrose 5 % and sodium chloride 0.45 % with KCl 20 mEq/L, 75 mL/hr, Last Rate: 75 mL/hr (20 1711)  lactated ringers, 9 mL/hr, Last Rate: 9 mL/hr (20 0656)        Scheduled Meds:ceFAZolin, 1 g, Intravenous, Q8H  celecoxib, 200 mg, Oral, Daily  lidocaine, 2 mL, Infiltration, Once      PRN Meds:•  acetaminophen **OR** acetaminophen  •  cyclobenzaprine  •  HYDROcodone-acetaminophen  •  HYDROmorphone **AND** naloxone  •  Morphine **AND** naloxone  •  promethazine **OR** promethazine  •  promethazine  •  senna-docusate sodium    Vital signs in last 24 hours:  Vitals:    20 1853 20 2106 20 0118 20 0450   BP: 147/87 126/72 133/67 127/77   BP Location: Right arm Left arm Left arm Left arm   Patient Position: Lying Lying Lying Lying   Pulse: 84 87 80 73   Resp: 18 18 18 18   Temp: 98.4 °F (36.9 °C) 97.3 °F (36.3 °C) 98 °F (36.7 °C) 97.1 °F (36.2 °C)   TempSrc: Oral Oral Oral Oral   SpO2: 99% 98% 97% 97%   Weight:       Height:           Intake/Output:I/O last 3 completed shifts:  In: 3120 [P.O.:120; I.V.:3000]  Out: 2400 [Urine:2400]    Exam:  GEN: no acute distress, resting quietly   HEENT: NCAT, EOMI, MMM   HEART: normal rate, regular rhythm   LUNGS: respirations non-labored   ABD: soft, nondistended, nontender   EXT: moves all extremities, no edema      No results found for this or any previous visit (from the past 24 hour(s)).      Assessment:    Localized deposits of fat      1 Day Post-Op Procedure(s) (LRB):  LIPOSUCTION ABDOMEN, BACK, BRA FAT, MONS PUBIS (Bilateral)  BILATERAL THIGHPLASTY (Bilateral)    Plan:  Ok for regular diet  No lifting more than 20 lb for next 2 weeks  Ok to remove dressings tomorrow and shower  Ok to sit, avoid opening the legs , ok to  clean the pelvic area with warm  water after using the commode    Narendra Garcia MD PhD    12/24/2020

## 2020-12-24 NOTE — PROGRESS NOTES
PLASTIC SURGERY PROGRESS NOTE    Patient Identification:  Name: Valerie Latham    Age: 50 y.o.    Sex: female   :  1970  MRN: [id number]               Subjective:  No acute events.    Objective:    Continuous Infusions:dextrose 5 % and sodium chloride 0.45 % with KCl 20 mEq/L, 75 mL/hr, Last Rate: 75 mL/hr (20 1711)  lactated ringers, 9 mL/hr, Last Rate: 9 mL/hr (20 0656)        Scheduled Meds:ceFAZolin, 1 g, Intravenous, Q8H  celecoxib, 200 mg, Oral, Daily  lidocaine, 2 mL, Infiltration, Once      PRN Meds:•  acetaminophen **OR** acetaminophen  •  cyclobenzaprine  •  HYDROcodone-acetaminophen  •  HYDROmorphone **AND** naloxone  •  Morphine **AND** naloxone  •  promethazine **OR** promethazine  •  promethazine  •  senna-docusate sodium    Vital signs in last 24 hours:  Vitals:    20 1853 20 2106 20 0118 20 0450   BP: 147/87 126/72 133/67 127/77   BP Location: Right arm Left arm Left arm Left arm   Patient Position: Lying Lying Lying Lying   Pulse: 84 87 80 73   Resp: 18 18 18 18   Temp: 98.4 °F (36.9 °C) 97.3 °F (36.3 °C) 98 °F (36.7 °C) 97.1 °F (36.2 °C)   TempSrc: Oral Oral Oral Oral   SpO2: 99% 98% 97% 97%   Weight:       Height:           Intake/Output:I/O last 3 completed shifts:  In: 3120 [P.O.:120; I.V.:3000]  Out: 2400 [Urine:2400]    Exam:  GEN: no acute distress, resting quietly   HEENT: NCAT, EOMI, MMM   HEART: normal rate, regular rhythm   LUNGS: respirations non-labored   ABD: soft, nondistended, nontender   EXT: moves all extremities, no edema      No results found for this or any previous visit (from the past 24 hour(s)).      Assessment:    Localized deposits of fat      1 Day Post-Op Procedure(s) (LRB):  LIPOSUCTION ABDOMEN, BACK, BRA FAT, MONS PUBIS (Bilateral)  BILATERAL THIGHPLASTY (Bilateral)    Plan:  Doing well,  Ok for regular diet  No lifting more than 20 lb for next 2 weeks  Ok to remove dressings tomorrow and shower  Ok to sit, avoid opening the  legs , ok to clean the pelvic area with warm  water after using the commode    Narendra Garcia MD PhD    12/24/2020

## 2020-12-24 NOTE — PLAN OF CARE
Goal Outcome Evaluation:  Plan of Care Reviewed With: patient     VSS, Pt medicated with Norco for pain, has 3 lap sites on abdomen, 2 abd pads in the creases at the thigh/ pelvic area. All CDI no drainage noted where visible. No c/o nausea, IVF flowing at 75mL/hr, khan inplace pt atates she was told she would likely go home with it. IS up to 1950. Expecting D/C today.

## 2020-12-24 NOTE — OUTREACH NOTE
Prep Survey      Responses   [facility] patient discharged from?  [place]   Is LACE score < 7 ?  Yes   Emergency Room discharge w/ pulse ox?  No   Eligibility  [facility]   Date of Admission  12/23/20   Date of Discharge  12/24/20   Discharge Disposition  Home or Self Care   Discharge diagnosis  Liposuction abdomen, back, mons pubis   Does the patient have one of the following disease processes/diagnoses(primary or secondary)?  General Surgery   Does the patient have Home health ordered?  No   Is there a DME ordered?  No   Prep survey completed?  Yes          Sanam Olivarez RN

## 2020-12-28 ENCOUNTER — TRANSITIONAL CARE MANAGEMENT TELEPHONE ENCOUNTER (OUTPATIENT)
Dept: CALL CENTER | Facility: HOSPITAL | Age: 50
End: 2020-12-28

## 2020-12-28 NOTE — OUTREACH NOTE
Call Center TCM Note      Responses   [facility] patient discharged from?  [place]   Does the patient have one of the following disease processes/diagnoses(primary or secondary)?  General Surgery   TCM attempt successful?  Yes   Discharge diagnosis  Liposuction abdomen, back, mons pubis   Meds reviewed with patient/caregiver?  Yes   Is the patient having any side effects they believe may be caused by any medication additions or changes?  No   Does the patient have all medications related to this admission filled (includes all antibiotics, pain medications, etc.)  Yes   Is the patient taking all medications as directed (includes completed medication regime)?  Yes   Does the patient have a follow up appointment scheduled with their surgeon?  Yes   Comments  First Post op was today 12/28/2020   Has home health visited the patient within 72 hours of discharge?  N/A   Psychosocial issues?  No   Did the patient receive a copy of their discharge instructions?  Yes   Nursing interventions  Reviewed instructions with patient   What is the patient's perception of their health status since discharge?  Improving   Is the patient /caregiver able to teach back basic post-op care?  Continue use of incentive spirometry at least 1 week post discharge, Drive as instructed by MD in discharge instructions, No tub bath, swimming, or hot tub until instructed by MD, Do not remove steri-strips, Lifting as instructed by MD in discharge instructions, Keep incision areas clean,dry and protected, Take showers only when approved by MD-sponge bathe until then, Practice 'cough and deep breath'   Is the patient/caregiver able to teach back signs and symptoms of incisional infection?  Increased redness, swelling or pain at the incisonal site, Pus or odor from incision, Fever, Increased drainage or bleeding, Incisional warmth   Is the patient/caregiver able to teach back steps to recovery at home?  Weigh daily, Make a list of questions for  surgeon's appointment, Eat a well-balance diet, Rest and rebuild strength, gradually increase activity, Set small, achievable goals for return to baseline health, Practice good oral hygiene   If the patient is a current smoker, are they able to teach back resources for cessation?  Not a smoker   Is the patient/caregiver able to teach back the hierarchy of who to call/visit for symptoms/problems? PCP, Specialist, Home health nurse, Urgent Care, ED, 911  Yes   TCM call completed?  Yes   Wrap up additional comments  Pt doing well s/p liposuction of multiple areas. First Post op was today 12/28/2020. Pt declines need for TCM FWP at this time           Fabi Angeles MA    12/28/2020, 16:34 EST

## 2020-12-28 NOTE — OUTREACH NOTE
Call Center TCM Note      Responses   [facility] patient discharged from?  [place]   Does the patient have one of the following disease processes/diagnoses(primary or secondary)?  General Surgery   TCM attempt successful?  No   Unsuccessful attempts  Attempt 1   Call Status  Left message          Fabi Angeles MA    12/28/2020, 14:48 EST

## 2020-12-28 NOTE — PROGRESS NOTES
Case Management Discharge Note      Final Note: Discharged home. Amelia Xiao, REGINA         Transportation Services  Private: Car    Final Discharge Disposition Code: 01 - home or self-care

## 2021-02-12 ENCOUNTER — OFFICE VISIT (OUTPATIENT)
Dept: INTERNAL MEDICINE | Facility: CLINIC | Age: 51
End: 2021-02-12

## 2021-02-12 VITALS
BODY MASS INDEX: 37.15 KG/M2 | HEIGHT: 65 IN | RESPIRATION RATE: 18 BRPM | HEART RATE: 85 BPM | SYSTOLIC BLOOD PRESSURE: 139 MMHG | TEMPERATURE: 97.8 F | WEIGHT: 223 LBS | OXYGEN SATURATION: 98 % | DIASTOLIC BLOOD PRESSURE: 90 MMHG

## 2021-02-12 DIAGNOSIS — B35.9 TINEA: ICD-10-CM

## 2021-02-12 DIAGNOSIS — M62.838 NECK MUSCLE SPASM: Primary | ICD-10-CM

## 2021-02-12 DIAGNOSIS — F41.9 ANXIETY: ICD-10-CM

## 2021-02-12 PROCEDURE — 99214 OFFICE O/P EST MOD 30 MIN: CPT | Performed by: NURSE PRACTITIONER

## 2021-02-12 RX ORDER — CYCLOBENZAPRINE HCL 5 MG
5 TABLET ORAL 3 TIMES DAILY PRN
Qty: 30 TABLET | Refills: 2 | Status: SHIPPED | OUTPATIENT
Start: 2021-02-12 | End: 2021-05-27 | Stop reason: SDUPTHER

## 2021-02-12 RX ORDER — NYSTATIN 100000 [USP'U]/G
POWDER TOPICAL 3 TIMES DAILY
Qty: 30 G | Refills: 1 | Status: SHIPPED | OUTPATIENT
Start: 2021-02-12 | End: 2022-05-23

## 2021-02-12 NOTE — PROGRESS NOTES
"Chief Complaint  Follow-up (anxiety, stress, neck tension) and Rash (bilat underarms)    Subjective     {Problem List  Visit Diagnosis   Encounters  Notes  Medications  Labs  Result Review Imaging  Media :23}     Valerie Latham presents to [facility]  Patient presents for 3-month follow-up on anxiety, neck tension.  This is a 50-year-old female.  I saw her last on 11/13/2020 at which time she was complaining of increased stress, anxiety and tension in her neck and shoulders.  She wanted to avoid anxiety medication.  She has increased exercise and lost about 10 pounds.  I prescribed Flexeril 5 mg 3 times daily as needed.  She takes this about once daily, toward the evening which helps with the neck tension and spasms.  She has tried acupuncture which has helped as well.  Reports that her anxiety level is decreasing because she has recently gotten a new, less stressful job.  She tends to sit at a desk throughout the day and as her stress builds, her shoulders and neck become more tense.  She states that the symptoms have improved.      She also endorses a rash to the bilateral underarms that comes and goes over the past 1 to 2 months.  She has tried switching deodorant and detergent which has not alleviated it.  It is red, not itchy or painful.  She has tried hydrocortisone which did not help much.  Denies development of any other new issues today.      Review of Systems   Musculoskeletal: Positive for arthralgias and neck pain.   All other systems reviewed and are negative.    Objective   Vital Signs:   /90 (BP Location: Left arm, Patient Position: Sitting, Cuff Size: Large Adult)   Pulse 85   Temp 97.8 °F (36.6 °C)   Resp 18   Ht 165.1 cm (65\")   Wt 101 kg (223 lb)   SpO2 98%   BMI 37.11 kg/m²     Physical Exam  Vitals signs and nursing note reviewed.   Constitutional:       General: She is not in acute distress.     Appearance: Normal appearance. She is " well-developed. She is not ill-appearing, toxic-appearing or diaphoretic.   HENT:      Head: Normocephalic and atraumatic.      Right Ear: External ear normal.      Left Ear: External ear normal.   Eyes:      Pupils: Pupils are equal, round, and reactive to light.   Neck:      Musculoskeletal: Normal range of motion and neck supple. No neck rigidity or muscular tenderness.      Vascular: No carotid bruit.   Cardiovascular:      Rate and Rhythm: Normal rate and regular rhythm.      Pulses: Normal pulses.      Heart sounds: Normal heart sounds.   Pulmonary:      Effort: Pulmonary effort is normal. No respiratory distress.      Breath sounds: Normal breath sounds. No stridor. No wheezing, rhonchi or rales.   Chest:      Chest wall: No tenderness.   Abdominal:      General: Bowel sounds are normal. There is no distension.      Palpations: Abdomen is soft. There is no mass.      Tenderness: There is no abdominal tenderness. There is no right CVA tenderness, left CVA tenderness, guarding or rebound.      Hernia: No hernia is present.   Musculoskeletal: Normal range of motion.   Lymphadenopathy:      Cervical: No cervical adenopathy.   Skin:     General: Skin is warm and dry.      Capillary Refill: Capillary refill takes less than 2 seconds.      Findings: Rash (  Bilateral underarms, beefy, red, no drainage) present.   Neurological:      General: No focal deficit present.      Mental Status: She is alert and oriented to person, place, and time. Mental status is at baseline.   Psychiatric:         Mood and Affect: Mood normal.         Behavior: Behavior normal.         Thought Content: Thought content normal.         Judgment: Judgment normal.        Result Review :   The following data was reviewed by: SUMANTH Blanco on 02/12/2021:  Common labs    Common Labsle 11/23/20 11/23/20 11/23/20 12/21/20 12/21/20    0954 0954 0954 1044 1044   Glucose     95   Glucose  87      BUN  11   10   Creatinine  0.76   0.83   eGFR Non   Am  81   73   eGFR African Am  98      Sodium  136   142   Potassium  4.3   4.6   Chloride  99   105   Calcium  9.5   9.6   Total Protein  6.7      Albumin  4.40      Total Bilirubin  0.5      Alkaline Phosphatase  45      AST (SGOT)  14      ALT (SGPT)  17      WBC 8.06   8.39    Hemoglobin 14.8   15.5    Hematocrit 43.6   45.4    Platelets 358   366    Total Cholesterol   185     Triglycerides   201 (A)     HDL Cholesterol   34 (A)     LDL Cholesterol    116 (A)     (A) Abnormal value            Current outpatient and discharge medications have been reconciled for the patient.  Reviewed by: SUMANTH Blanco           Assessment and Plan    Diagnoses and all orders for this visit:    1. Neck muscle spasm (Primary)  Comments:  Continue Flexeril 5 mg 3 times daily as needed.  Continue acupuncture, stress reduction.  Advised heat and massage.  Improved.  Orders:  -     cyclobenzaprine (FLEXERIL) 5 MG tablet; Take 1 tablet by mouth 3 (Three) Times a Day As Needed for Muscle Spasms.  Dispense: 30 tablet; Refill: 2    2. Tinea  Comments:  Bilateral underarms-prescribed nystatin powder to be applied topically twice daily to 3 times daily.  Orders:  -     nystatin (MYCOSTATIN) 195787 UNIT/GM powder; Apply  topically to the appropriate area as directed 3 (Three) Times a Day.  Dispense: 30 g; Refill: 1    3. Anxiety  Comments:  Improved with her job change.  Continue stress reduction techniques and regular exercise.      Follow-up as needed if symptoms persist or worsen and in 6 months for a physical.    Follow Up   Return in about 6 months (around 8/12/2021) for Medicare Wellness.  Patient was given instructions and counseling regarding her condition or for health maintenance advice. Please see specific information pulled into the AVS if appropriate.

## 2021-02-24 DIAGNOSIS — E66.09 OBESITY DUE TO EXCESS CALORIES, UNSPECIFIED CLASSIFICATION, UNSPECIFIED WHETHER SERIOUS COMORBIDITY PRESENT: Primary | ICD-10-CM

## 2021-03-24 ENCOUNTER — IMMUNIZATION (OUTPATIENT)
Dept: VACCINE CLINIC | Facility: HOSPITAL | Age: 51
End: 2021-03-24

## 2021-03-24 PROCEDURE — 0001A: CPT | Performed by: INTERNAL MEDICINE

## 2021-03-24 PROCEDURE — 91300 HC SARSCOV02 VAC 30MCG/0.3ML IM: CPT | Performed by: INTERNAL MEDICINE

## 2021-04-14 ENCOUNTER — IMMUNIZATION (OUTPATIENT)
Dept: VACCINE CLINIC | Facility: HOSPITAL | Age: 51
End: 2021-04-14

## 2021-04-14 PROCEDURE — 91300 HC SARSCOV02 VAC 30MCG/0.3ML IM: CPT | Performed by: INTERNAL MEDICINE

## 2021-04-14 PROCEDURE — 0002A: CPT | Performed by: INTERNAL MEDICINE

## 2021-05-14 ENCOUNTER — TELEPHONE (OUTPATIENT)
Dept: INTERNAL MEDICINE | Facility: CLINIC | Age: 51
End: 2021-05-14

## 2021-05-14 NOTE — TELEPHONE ENCOUNTER
Due to no appointments being available. Pt advised to go to a [facility] for this matter. I didn't want her to go with out seeing someone more than today due to Pt's symptoms.   Pt has low back pain and stated she could not get comfortable.    Pt verbally understood and agreed.

## 2021-05-14 NOTE — TELEPHONE ENCOUNTER
PATIENT CALLING IN REGARDS TO REQUESTING SOME MEDICATION FOR A POSSIBLE KIDNEY INFECTION. PLEASE ADVISE, THANK YOU!

## 2021-05-17 ENCOUNTER — TELEPHONE (OUTPATIENT)
Dept: INTERNAL MEDICINE | Facility: CLINIC | Age: 51
End: 2021-05-17

## 2021-05-17 NOTE — TELEPHONE ENCOUNTER
Caller: Valerie Latham    Relationship: Self    Best call back number: [phone]      Who are you requesting to speak with (clinical staff, provider,  specific staff member): ALLISON BARRERA    What was the call regarding: FOLLOW UP FROM URGENT CARE    Do you require a callback: YES

## 2021-05-17 NOTE — TELEPHONE ENCOUNTER
Appointment made for patient, left patient detailed message. Notified her is unable to keep appointment she is to contact our office to reschedule to the next available time.

## 2021-05-18 ENCOUNTER — OFFICE VISIT (OUTPATIENT)
Dept: INTERNAL MEDICINE | Facility: CLINIC | Age: 51
End: 2021-05-18

## 2021-05-18 ENCOUNTER — HOSPITAL ENCOUNTER (OUTPATIENT)
Dept: GENERAL RADIOLOGY | Facility: HOSPITAL | Age: 51
Discharge: HOME | End: 2021-05-18
Admitting: NURSE PRACTITIONER

## 2021-05-18 VITALS
DIASTOLIC BLOOD PRESSURE: 95 MMHG | OXYGEN SATURATION: 98 % | TEMPERATURE: 98 F | HEIGHT: 66 IN | WEIGHT: 223 LBS | SYSTOLIC BLOOD PRESSURE: 149 MMHG | HEART RATE: 79 BPM | BODY MASS INDEX: 35.84 KG/M2 | RESPIRATION RATE: 17 BRPM

## 2021-05-18 DIAGNOSIS — R31.29 MICROSCOPIC HEMATURIA: ICD-10-CM

## 2021-05-18 DIAGNOSIS — R03.0 ELEVATED BLOOD PRESSURE READING IN OFFICE WITHOUT DIAGNOSIS OF HYPERTENSION: ICD-10-CM

## 2021-05-18 DIAGNOSIS — R10.9 RIGHT FLANK PAIN: ICD-10-CM

## 2021-05-18 DIAGNOSIS — R10.9 RIGHT FLANK PAIN: Primary | ICD-10-CM

## 2021-05-18 PROCEDURE — 74018 RADEX ABDOMEN 1 VIEW: CPT

## 2021-05-18 PROCEDURE — 99214 OFFICE O/P EST MOD 30 MIN: CPT | Performed by: NURSE PRACTITIONER

## 2021-05-18 NOTE — PROGRESS NOTES
"Chief Complaint  Follow-up (Pt presents here today for a follow up from .)    Subjective          Valerie Latham presents to [facility]  Patient presents for urgent care center follow-up, right flank pain.  This is a 50-year-old female.  Symptoms began about 3 weeks ago.  She endorses waxing and waning right flank pain that radiates to the left flank.  No outright blood in the urine but at urgent care center on 5/14/2021 she showed trace blood on urinalysis and was given Macrobid 100 mg twice daily x10 days.  She has taken this since 5/14/2021.  Urine culture was not done, no nitrites or leukocytes showed on urinalysis.  Denies urinary frequency, hesitancy, urgency or burning.  No foul-smelling or discolored urine.  She denies any known history of renal lithiasis or family history.  Pain is associated with some mild nausea.  She has tried to increase her water intake over the past few days which has not helped much.  Also reports that her blood pressure at healthcare provider visits has been a bit high but she wonders whether this is true hypertension or related to her underlying symptoms.  No headaches, visual changes, shortness of breath, chest discomfort or fever/chills.  Denies development of any other new issues today.      Objective   Vital Signs:   /95 (BP Location: Left arm, Patient Position: Sitting, Cuff Size: Large Adult)   Pulse 79   Temp 98 °F (36.7 °C)   Resp 17   Ht 167.6 cm (66\")   Wt 101 kg (223 lb)   SpO2 98%   BMI 35.99 kg/m²     Physical Exam  Vitals and nursing note reviewed.   Constitutional:       General: She is not in acute distress.     Appearance: Normal appearance. She is well-developed. She is obese. She is not ill-appearing, toxic-appearing or diaphoretic.   HENT:      Head: Normocephalic and atraumatic.      Right Ear: External ear normal.      Left Ear: External ear normal.   Eyes:      Pupils: Pupils are equal, round, and reactive to " light.   Neck:      Vascular: No carotid bruit.   Cardiovascular:      Rate and Rhythm: Normal rate and regular rhythm.      Pulses: Normal pulses.      Heart sounds: Normal heart sounds.   Pulmonary:      Effort: Pulmonary effort is normal. No respiratory distress.      Breath sounds: Normal breath sounds. No stridor. No wheezing, rhonchi or rales.   Chest:      Chest wall: No tenderness.   Abdominal:      General: Bowel sounds are normal. There is no distension.      Palpations: Abdomen is soft. There is no mass.      Tenderness: There is no abdominal tenderness. There is right CVA tenderness. There is no left CVA tenderness, guarding or rebound.      Hernia: No hernia is present.   Musculoskeletal:         General: Normal range of motion.      Cervical back: Normal range of motion and neck supple. No rigidity or tenderness.   Lymphadenopathy:      Cervical: No cervical adenopathy.   Skin:     General: Skin is warm and dry.      Capillary Refill: Capillary refill takes less than 2 seconds.   Neurological:      General: No focal deficit present.      Mental Status: She is alert and oriented to person, place, and time. Mental status is at baseline.   Psychiatric:         Mood and Affect: Mood normal.         Behavior: Behavior normal.         Thought Content: Thought content normal.         Judgment: Judgment normal.        Result Review :   The following data was reviewed by: SUMANTH White on 05/18/2021:  Common labs    Common Labsle 11/23/20 11/23/20 11/23/20 12/21/20 12/21/20    0954 0954 0954 1044 1044   Glucose     95   Glucose  87      BUN  11   10   Creatinine  0.76   0.83   eGFR Non  Am  81   73   eGFR African Am  98      Sodium  136   142   Potassium  4.3   4.6   Chloride  99   105   Calcium  9.5   9.6   Total Protein  6.7      Albumin  4.40      Total Bilirubin  0.5      Alkaline Phosphatase  45      AST (SGOT)  14      ALT (SGPT)  17      WBC 8.06   8.39    Hemoglobin 14.8   15.5       Hematocrit 43.6   45.4    Platelets 358   366    Total Cholesterol   185     Triglycerides   201 (A)     HDL Cholesterol   34 (A)     LDL Cholesterol    116 (A)     (A) Abnormal value            Current outpatient and discharge medications have been reconciled for the patient.  Reviewed by: SUMANTH White      with Curtis Weinberg MD (05/14/2021)    Brief Urine Lab Results  (Last result in the past 365 days)      Color   Clarity   Blood   Leuk Est   Nitrite   Protein   CREAT   Urine HCG        05/14/21 1547 Yellow Clear Small Negative Negative Negative                      Assessment and Plan    Diagnoses and all orders for this visit:    1. Right flank pain (Primary)  -     Urine Culture - Urine, Urine, Clean Catch  -     XR Abdomen KUB; Future  -     CBC No Differential  -     Comprehensive metabolic panel    2. Microscopic hematuria  -     Urine Culture - Urine, Urine, Clean Catch  -     XR Abdomen KUB; Future  -     CBC No Differential  -     Comprehensive metabolic panel    3. Elevated blood pressure reading in office without diagnosis of hypertension  Comments:  2 weeks of home blood pressure readings, DASH diet, exercise.  Follow-up via telehealth in 2 weeks, if hypertensive will initiate therapy.      Right flank pain, microscopic hematuria: Checking urine culture today, she will stay on Macrobid until urine culture is back.  I suspect potential right renal lithiasis.  Checking KUB, encouraged increasing water intake.  CBC, CMP.    We will contact patient with labs, urine and imaging results and any further recommendations.  Follow-up as needed and at next scheduled office visit.    Follow Up   No follow-ups on file.  Patient was given instructions and counseling regarding her condition or for health maintenance advice. Please see specific information pulled into the AVS if appropriate.

## 2021-05-20 ENCOUNTER — TELEPHONE (OUTPATIENT)
Dept: INTERNAL MEDICINE | Facility: CLINIC | Age: 51
End: 2021-05-20

## 2021-05-20 DIAGNOSIS — R31.29 MICROSCOPIC HEMATURIA: ICD-10-CM

## 2021-05-20 DIAGNOSIS — R10.9 RIGHT FLANK PAIN: Primary | ICD-10-CM

## 2021-05-20 LAB
ALBUMIN SERPL-MCNC: 5 G/DL (ref 3.5–5.2)
ALBUMIN/GLOB SERPL: 2.1 G/DL
ALP SERPL-CCNC: 57 U/L (ref 39–117)
ALT SERPL-CCNC: 16 U/L (ref 1–33)
AST SERPL-CCNC: 19 U/L (ref 1–32)
BACTERIA UR CULT: NORMAL
BACTERIA UR CULT: NORMAL
BILIRUB SERPL-MCNC: 0.4 MG/DL (ref 0–1.2)
BUN SERPL-MCNC: 11 MG/DL (ref 6–20)
BUN/CREAT SERPL: 15.9 (ref 7–25)
CALCIUM SERPL-MCNC: 10.3 MG/DL (ref 8.6–10.5)
CHLORIDE SERPL-SCNC: 102 MMOL/L (ref 98–107)
CO2 SERPL-SCNC: 31.4 MMOL/L (ref 22–29)
CREAT SERPL-MCNC: 0.69 MG/DL (ref 0.57–1)
ERYTHROCYTE [DISTWIDTH] IN BLOOD BY AUTOMATED COUNT: 14.1 % (ref 12.3–15.4)
GLOBULIN SER CALC-MCNC: 2.4 GM/DL
GLUCOSE SERPL-MCNC: 91 MG/DL (ref 65–99)
HCT VFR BLD AUTO: 44.8 % (ref 34–46.6)
HGB BLD-MCNC: 15.7 G/DL (ref 12–15.9)
MCH RBC QN AUTO: 31.1 PG (ref 26.6–33)
MCHC RBC AUTO-ENTMCNC: 35 G/DL (ref 31.5–35.7)
MCV RBC AUTO: 88.7 FL (ref 79–97)
PLATELET # BLD AUTO: 352 10*3/MM3 (ref 140–450)
POTASSIUM SERPL-SCNC: 5.3 MMOL/L (ref 3.5–5.2)
PROT SERPL-MCNC: 7.4 G/DL (ref 6–8.5)
RBC # BLD AUTO: 5.05 10*6/MM3 (ref 3.77–5.28)
SODIUM SERPL-SCNC: 142 MMOL/L (ref 136–145)
WBC # BLD AUTO: 9.31 10*3/MM3 (ref 3.4–10.8)

## 2021-05-20 NOTE — TELEPHONE ENCOUNTER
----- Message from SUMANTH White sent at 5/20/2021  9:53 AM EDT -----  Please call pt-her x-ray did not come back showing evidence of a kidney stone.  Urine culture came back stable, no UTI.  Blood count is normal along with stable metabolic panel, kidney function and electrolytes.  It is possible that she has a very sm  all kidney stone that is not showing up on the x-ray.  Please let me know if her pain is still present, if so we can get her CT scan ordered.  She can go ahead and stop the antibiotic since there is no UTI.

## 2021-05-20 NOTE — TELEPHONE ENCOUNTER
Caller: Valerie Latham    Relationship to patient: Self    Best call back number: [phone]     Patient is needing: PATIENT CALLED RETURNING FRANCESCA'S CALL TO HER.  PLEASE ADVISE.

## 2021-05-20 NOTE — PROGRESS NOTES
Please call pt-her x-ray did not come back showing evidence of a kidney stone.  Urine culture came back stable, no UTI.  Blood count is normal along with stable metabolic panel, kidney function and electrolytes.  It is possible that she has a very small kidney stone that is not showing up on the x-ray.  Please let me know if her pain is still present, if so we can get her CT scan ordered.  She can go ahead and stop the antibiotic since there is no UTI.

## 2021-05-20 NOTE — TELEPHONE ENCOUNTER
Caller: Valerie Latham    Relationship: Self    Best call back number: [phone]    Caller requesting test results: YES    What test was performed: XRAY AND BLOOD/URINE TEST    When was the test performed: 5/18/21    Where was the test performed:     Additional notes:     PATIENT WOULD LIKE A FOLLOW UP CALL TO DISCUSS RESULTS. PAIN HAS NOT SUBSIDED SINCE THE APPOINTMENT ON Tuesday 5/18/21

## 2021-05-20 NOTE — TELEPHONE ENCOUNTER
Pt advised, Pt verbally understood.     Pt states she is still in a lot of pain, specially when she is trying to sleep. Please advise?    Pt would like the CT

## 2021-05-21 ENCOUNTER — TELEPHONE (OUTPATIENT)
Dept: INTERNAL MEDICINE | Facility: CLINIC | Age: 51
End: 2021-05-21

## 2021-05-21 NOTE — TELEPHONE ENCOUNTER
Stephanie gonzalez/ [facility] called in regards to Ms. Latham. She stated the CT order is for w/ and w/o, and this generally is w/o unless she is looking for something.    Per Agustina she stated she would like to have w/ and w/o.    Stephanie was informed of this.

## 2021-05-21 NOTE — TELEPHONE ENCOUNTER
Pt informed of Xray results on 5/20/2021  Pt verbally understood.  SUMANTH White ordered CT for further evaluation

## 2021-05-24 ENCOUNTER — HOSPITAL ENCOUNTER (OUTPATIENT)
Dept: CT IMAGING | Facility: HOSPITAL | Age: 51
Discharge: HOME | End: 2021-05-24
Admitting: NURSE PRACTITIONER

## 2021-05-24 DIAGNOSIS — R31.29 MICROSCOPIC HEMATURIA: ICD-10-CM

## 2021-05-24 DIAGNOSIS — R10.9 RIGHT FLANK PAIN: ICD-10-CM

## 2021-05-24 PROCEDURE — 74178 CT ABD&PLV WO CNTR FLWD CNTR: CPT

## 2021-05-24 PROCEDURE — 25010000002 IOPAMIDOL 61 % SOLUTION: Performed by: NURSE PRACTITIONER

## 2021-05-24 RX ADMIN — IOPAMIDOL 85 ML: 612 INJECTION, SOLUTION INTRAVENOUS at 08:12

## 2021-05-27 ENCOUNTER — TELEPHONE (OUTPATIENT)
Dept: INTERNAL MEDICINE | Facility: CLINIC | Age: 51
End: 2021-05-27

## 2021-05-27 DIAGNOSIS — N28.9 RENAL LESION: ICD-10-CM

## 2021-05-27 DIAGNOSIS — R10.9 RIGHT FLANK PAIN: Primary | ICD-10-CM

## 2021-05-27 RX ORDER — CYCLOBENZAPRINE HCL 5 MG
5 TABLET ORAL 3 TIMES DAILY PRN
Qty: 30 TABLET | Refills: 2 | Status: SHIPPED | OUTPATIENT
Start: 2021-05-27 | End: 2021-09-17

## 2021-05-27 NOTE — TELEPHONE ENCOUNTER
----- Message from SUMANTH White sent at 5/27/2021  1:20 PM EDT -----  Please call pt-CT of the abdomen and pelvis is showing no kidney stones or swelling of the kidneys.  She has a small 1 cm spot on the upper part of her left kidney that is too small to accurately characterize.  The radiologist recommended repeating i  maging in 6 to 12 months to compare to this CT scan.  I ordered a repeat CT for 6 months.  Since we have ruled out a kidney stone, I believe her pain may be related to a muscular issue.  I have sent a prescription for a muscle relaxer to her pharmacy  , use heat as well let me know if this persists or worsens.

## 2021-05-27 NOTE — TELEPHONE ENCOUNTER
" Agustina Carmona APRN   5/27/2021  2:51 PM EDT      Diverticulosis means that there are small outpouchings on the colon, these are very common and present with many people.  This should not be causing the pain.    Samira Rosario MA   5/27/2021  2:50 PM EDT      Pt states that she read the results on My Chart. Pt states she wants to know what the radiologist meant by \"Diverticulosis is present\"     Please advise?       "

## 2021-05-27 NOTE — PROGRESS NOTES
Please call pt-CT of the abdomen and pelvis is showing no kidney stones or swelling of the kidneys.  She has a small 1 cm spot on the upper part of her left kidney that is too small to accurately characterize.  The radiologist recommended repeating imaging in 6 to 12 months to compare to this CT scan.  I ordered a repeat CT for 6 months.  Since we have ruled out a kidney stone, I believe her pain may be related to a muscular issue.  I have sent a prescription for a muscle relaxer to her pharmacy, use heat as well let me know if this persists or worsens.

## 2021-05-27 NOTE — PROGRESS NOTES
Diverticulosis means that there are small outpouchings on the colon, these are very common and present with many people.  This should not be causing the pain.

## 2021-06-04 ENCOUNTER — APPOINTMENT (OUTPATIENT)
Dept: WOMENS IMAGING | Facility: HOSPITAL | Age: 51
End: 2021-06-04

## 2021-06-04 PROCEDURE — 77063 BREAST TOMOSYNTHESIS BI: CPT | Performed by: RADIOLOGY

## 2021-06-04 PROCEDURE — 77067 SCR MAMMO BI INCL CAD: CPT | Performed by: RADIOLOGY

## 2021-06-09 ENCOUNTER — TELEMEDICINE (OUTPATIENT)
Dept: INTERNAL MEDICINE | Facility: CLINIC | Age: 51
End: 2021-06-09

## 2021-06-09 DIAGNOSIS — R03.0 ELEVATED BLOOD PRESSURE READING IN OFFICE WITHOUT DIAGNOSIS OF HYPERTENSION: Primary | ICD-10-CM

## 2021-06-14 ENCOUNTER — TELEMEDICINE (OUTPATIENT)
Dept: INTERNAL MEDICINE | Facility: CLINIC | Age: 51
End: 2021-06-14

## 2021-06-14 DIAGNOSIS — R03.0 ELEVATED BLOOD PRESSURE READING: Primary | ICD-10-CM

## 2021-06-14 DIAGNOSIS — Z82.49 FH: CAD (CORONARY ARTERY DISEASE): Chronic | ICD-10-CM

## 2021-06-14 PROCEDURE — 99213 OFFICE O/P EST LOW 20 MIN: CPT | Performed by: NURSE PRACTITIONER

## 2021-06-14 NOTE — PROGRESS NOTES
Chief Complaint  Hypertension  You have chosen to receive care through a telehealth visit.  Do you consent to use a video/audio connection for your medical care today? Yes    Subjective          Valerie Latham presents to [facility]  Patient presents for follow-up on blood pressure.  This is a 50-year-old female.  I last saw her on 5/18/2021 at which time office blood pressure reading was a bit high at 149/95.  I recommended home blood pressure monitoring, goal of less than 130/80 and DASH diet/regular exercise.  She presents today via [employer] video for follow-up.  She states that her home blood pressure cuff has unfortunately broken and she has not been able to take it regularly at home.  She has had a few healthcare provider visits and has showed normal readings for the most part at those times.  She is scheduled for surgical removal of her lap band tomorrow with Dr. Phipps with [facility].  She plans to start checking her blood pressure with a new home cuff a week or 2 after surgery, reporting readings in a log for follow-up.  She does have a family history of coronary artery disease and wants to minimize her risk factors.  She reports good preoperative labs and EKG.  Denies chest pain, shortness of breath, development of any other new issues today.      Objective   Vital Signs:   There were no vitals taken for this visit.    Physical Exam  Neurological:      Mental Status: She is alert and oriented to person, place, and time.        Result Review :{Labs  Result Review  Imaging  Med Tab  Media  Procedures :23}   The following data was reviewed by: SUMANTH White on 06/14/2021:  Common labs    Common Labsle 11/23/20 11/23/20 11/23/20 12/21/20 12/21/20 5/18/21 5/18/21    0954 0954 0954 1044 1044 1233 1233   Glucose     95     Glucose  87     91   BUN  11   10  11   Creatinine  0.76   0.83  0.69   eGFR Non  Am  81   73  90   eGFR  Am  98     110   Sodium  136    142  142   Potassium  4.3   4.6  5.3 (A)   Chloride  99   105  102   Calcium  9.5   9.6  10.3   Total Protein  6.7     7.4   Albumin  4.40     5.00   Total Bilirubin  0.5     0.4   Alkaline Phosphatase  45     57   AST (SGOT)  14     19   ALT (SGPT)  17     16   WBC 8.06   8.39  9.31    Hemoglobin 14.8   15.5  15.7    Hematocrit 43.6   45.4  44.8    Platelets 358   366  352    Total Cholesterol   185       Triglycerides   201 (A)       HDL Cholesterol   34 (A)       LDL Cholesterol    116 (A)       (A) Abnormal value       Comments are available for some flowsheets but are not being displayed.           Current outpatient and discharge medications have been reconciled for the patient.  Reviewed by: SUMANTH White           Assessment and Plan    Diagnoses and all orders for this visit:    1. Elevated blood pressure reading (Primary)    2. FH: CAD (coronary artery disease)      Discussed goal of blood pressure management, less than 130/80.  She will start monitoring blood pressures consistently at home after her surgery.    Regular exercise, goal of 30 minutes/day 5 days/week is recommended when she is cleared postoperatively.  She is having removal of her gastric band tomorrow with Dr. Phipps.    I will see her back in August 2021 for her physical.  She will bring her home blood pressures at that time.    This visit has been conducted via video therefore no physical exam was performed.    This visit has been rescheduled as a video visit to comply with patient safety concerns in accordance with CDC recommendations. Total time of discussion was 10 minutes.        Follow Up   No follow-ups on file.  Patient was given instructions and counseling regarding her condition or for health maintenance advice. Please see specific information pulled into the AVS if appropriate.

## 2021-08-23 NOTE — PATIENT INSTRUCTIONS
Continue HEP  Keep tape on unless skin burns or itches or increases pain.     Daily Note     Today's date: 2021  Patient name: Doylene Gitelman  : 1953  MRN: [id number]  Referring provider: Lorena Rodriguez PA-C  Dx:   Encounter Diagnosis     ICD-10-CM    1  Neck pain  M54 2        Start Time: 1628  Stop Time: 0940  Total time in clinic (min): 50 minutes    Subjective: Patient has no complaints of pain this morning  However, he notes not doing much over the weekend because he just didn't feel great  Objective: See treatment diary below  Assessment: Tolerated treatment well  Patient still appropriately fatiguing to the current PT POC with no adverse effect  Continue to progress [facility] as able  Plan: Continue per plan of care        Precautions: none    Manuals 8-3 8-9 8-19 8-     ST R lower cervical facet, levator/UT Same - MM R lower cervical facet, levator/UT- CM      Joint work Assisted L upglide with R ROT in painfree ROM        flexibility                  Neuro Re-Ed         Cervical stability work  Retractions 2x10 3" 3" 2x10 3" 2x10       Cervical ROT isometrics x10 ea Cervical rot iso 10x  Cervical rot iso 10x     Scapular 2-way horiz abd otb x25 ea gtb 3x10 ea gtb 3x10 Green 3x10 ea     tband rows gtb x30 btb x30 btb 3x10 Blue 3x10     tband ext gtb x30 btb x30 btb 3x10 Blue 3x10                       Ther Ex         UBE    5' retro     S/l ER         Prone rows         Prone horiz abd         Bicep curls  Blue tband x30 btb 3x10 Blue 3x10     tband ir/er  gtb 2x20 ea gtb 3x10 Green 3x10     Tricep    btb 3x10 Blue 3x10     1/2 roll thoracic     5"x10     Ther Activity                           Modalities         MHP   10'

## 2021-09-17 DIAGNOSIS — R10.9 RIGHT FLANK PAIN: ICD-10-CM

## 2021-09-17 RX ORDER — CYCLOBENZAPRINE HCL 5 MG
TABLET ORAL
Qty: 30 TABLET | Refills: 2 | Status: SHIPPED | OUTPATIENT
Start: 2021-09-17 | End: 2021-12-22

## 2021-12-21 DIAGNOSIS — R10.9 RIGHT FLANK PAIN: ICD-10-CM

## 2021-12-22 RX ORDER — CYCLOBENZAPRINE HCL 5 MG
TABLET ORAL
Qty: 30 TABLET | Refills: 2 | Status: SHIPPED | OUTPATIENT
Start: 2021-12-22 | End: 2022-05-23

## 2022-05-23 ENCOUNTER — TELEPHONE (OUTPATIENT)
Dept: INTERNAL MEDICINE | Facility: CLINIC | Age: 52
End: 2022-05-23

## 2022-05-23 ENCOUNTER — OFFICE VISIT (OUTPATIENT)
Dept: INTERNAL MEDICINE | Facility: CLINIC | Age: 52
End: 2022-05-23

## 2022-05-23 VITALS
WEIGHT: 224 LBS | OXYGEN SATURATION: 97 % | TEMPERATURE: 97.9 F | HEIGHT: 66 IN | BODY MASS INDEX: 36 KG/M2 | HEART RATE: 86 BPM | SYSTOLIC BLOOD PRESSURE: 144 MMHG | DIASTOLIC BLOOD PRESSURE: 90 MMHG

## 2022-05-23 DIAGNOSIS — K57.92 DIVERTICULITIS: Primary | ICD-10-CM

## 2022-05-23 PROCEDURE — 99213 OFFICE O/P EST LOW 20 MIN: CPT | Performed by: NURSE PRACTITIONER

## 2022-05-23 RX ORDER — CIPROFLOXACIN 500 MG/1
500 TABLET, FILM COATED ORAL 2 TIMES DAILY
COMMUNITY
End: 2022-09-09

## 2022-05-23 RX ORDER — METRONIDAZOLE 500 MG/1
500 TABLET ORAL 3 TIMES DAILY
COMMUNITY
End: 2022-09-09

## 2022-05-23 RX ORDER — DICYCLOMINE HYDROCHLORIDE 10 MG/1
10 CAPSULE ORAL 3 TIMES DAILY PRN
Qty: 21 CAPSULE | Refills: 0 | Status: SHIPPED | OUTPATIENT
Start: 2022-05-23 | End: 2022-05-30

## 2022-05-23 NOTE — ASSESSMENT & PLAN NOTE
Continue on soft diet and advance as tolerated.   Start miralax 1 packet daily.   Continue with high fiber diet.   Continue finishing flagyl.   Bentyl PRN.

## 2022-05-23 NOTE — PROGRESS NOTES
"Chief Complaint  Diverticulitis (Discuss medications) and Abdominal Pain    Subjective          Valerievania Latham presents to [facility]  History of Present Illness  This is a 50 y/o female presenting to office for f/u with diverticulitis with complaints of abdominal pain. Patient recently seen in urgent care on 5/9/22 for abdominal pain and was found to have suspected diverticulitis. Patient was placed on ciprofloxacin and flagyl for treatment. Patient reports symptoms had improved but then patient had recurrent symptoms 3 days ago. Patient reports symptoms improved. Patient denies any fever. Patient reports having normal bowel movements. Patient denies any nausea or vomiting. Patient reports she is planning on leaving for vacation tonight.     Objective   Vital Signs:  /90   Pulse 86   Temp 97.9 °F (36.6 °C)   Ht 167.6 cm (66\")   Wt 102 kg (224 lb)   SpO2 97%   BMI 36.15 kg/m²         Physical Exam  Constitutional:       Appearance: Normal appearance. She is normal weight.   HENT:      Head: Normocephalic and atraumatic.      Mouth/Throat:      Mouth: Mucous membranes are moist.      Pharynx: Oropharynx is clear.   Eyes:      Extraocular Movements: Extraocular movements intact.      Conjunctiva/sclera: Conjunctivae normal.      Pupils: Pupils are equal, round, and reactive to light.   Cardiovascular:      Rate and Rhythm: Normal rate and regular rhythm.      Pulses: Normal pulses.      Heart sounds: Normal heart sounds. No murmur heard.    No friction rub. No gallop.   Pulmonary:      Effort: Pulmonary effort is normal. No respiratory distress.      Breath sounds: Normal breath sounds. No stridor. No wheezing, rhonchi or rales.   Abdominal:      General: Bowel sounds are normal. There is no distension.      Tenderness: There is no abdominal tenderness. There is no guarding.   Musculoskeletal:         General: No swelling or deformity. Normal range of motion.   Skin:     " General: Skin is warm.      Coloration: Skin is not jaundiced.      Findings: No bruising.   Neurological:      General: No focal deficit present.      Mental Status: She is alert and oriented to person, place, and time. Mental status is at baseline.   Psychiatric:         Mood and Affect: Mood normal.         Behavior: Behavior normal.         Thought Content: Thought content normal.         Judgment: Judgment normal.        Result Review :   The following data was reviewed by: SUMANTH Soria on 05/23/2022:        Reviewed:  Urgent Care Provider Note by Jalil Heaton MD (05/09/2022 6:48 PM)       Assessment and Plan {CC Problem List  Visit Diagnosis   ROS  Review (Popup)  Health Maintenance  Quality  BestPractice  Medications  SmartSets  SnapShot Encounters  Media :23}   Diagnoses and all orders for this visit:    1. Diverticulitis (Primary)  Assessment & Plan:  Continue on soft diet and advance as tolerated.   Start miralax 1 packet daily.   Continue with high fiber diet.   Continue finishing flagyl.   Bentyl PRN.     Orders:  -     dicyclomine (Bentyl) 10 MG capsule; Take 1 capsule by mouth 3 (Three) Times a Day As Needed (stomach cramping) for up to 7 days.  Dispense: 21 capsule; Refill: 0           Follow Up   No follow-ups on file.  Patient was given instructions and counseling regarding her condition or for health maintenance advice. Please see specific information pulled into the AVS if appropriate.

## 2022-05-23 NOTE — TELEPHONE ENCOUNTER
Pt advised, we don't treat over the phone she will need to be seen.     Pt stated that she was just seen at  for his. I advised we are two different office/practice/ and facility. We do not treat over the phone.     Pt verbalized understanding. Pt schedule for an appt today @ 2 w/SUMANTH Osorio.

## 2022-05-23 NOTE — TELEPHONE ENCOUNTER
Caller: Valerie Latham    Relationship: Self    Best call back number: [phone]    What medication are you requesting: ANTIBIOTIC    What are your current symptoms: FLAIR UP OF DIVERTICULITIS    How long have you been experiencing symptoms: 5/20    Have you had these symptoms before:    [x] Yes  [] No    Have you been treated for these symptoms before:   [x] Yes  [] No    If a prescription is needed, what is your preferred pharmacy and phone number:     [id number] [address] AT [facility] - [phone] [facility] [phone] FX      Additional notes:  PATIENT IS GOING ON VACATION AND WOULD LIKE SOME ANTIBIOTICS TO TAKE FOR HER FLAIR UP

## 2022-06-02 ENCOUNTER — OFFICE VISIT (OUTPATIENT)
Dept: ORTHOPEDIC SURGERY | Facility: CLINIC | Age: 52
End: 2022-06-02

## 2022-06-02 VITALS — TEMPERATURE: 97.8 F | BODY MASS INDEX: 35.68 KG/M2 | HEIGHT: 66 IN | WEIGHT: 222 LBS

## 2022-06-02 DIAGNOSIS — M76.61 ACHILLES TENDINITIS OF RIGHT LOWER EXTREMITY: ICD-10-CM

## 2022-06-02 DIAGNOSIS — M77.31 HEEL SPUR, RIGHT: ICD-10-CM

## 2022-06-02 DIAGNOSIS — M25.571 RIGHT ANKLE PAIN, UNSPECIFIED CHRONICITY: Primary | ICD-10-CM

## 2022-06-02 PROCEDURE — 73610 X-RAY EXAM OF ANKLE: CPT | Performed by: NURSE PRACTITIONER

## 2022-06-02 PROCEDURE — 99213 OFFICE O/P EST LOW 20 MIN: CPT | Performed by: NURSE PRACTITIONER

## 2022-06-02 NOTE — PROGRESS NOTES
Answers for HPI/ROS submitted by the patient on 6/1/2022  Please describe your symptoms.: Achilles tendon on right leg; pulled something last week while on vacation and achilles felt like it was tickling the back of my leg.  Have you had these symptoms before?: No  How long have you been having these symptoms?: 5-7 days  What is the primary reason for your visit?: Other    Patient Name: Valerie Latham   YOB: 1970  Referring Primary Care Physician: Agustina Carmona APRN  BMI: Body mass index is 35.83 kg/m².    Chief Complaint:    Chief Complaint   Patient presents with   • Right Ankle - Pain        HPI: New patient here concerns about right Achilles pain she recently got back from a trip to [place] did a lot of walking up and down stairs and had pain in her Achilles that is since improved some with rest almost canceled the appointment.  No history of prior injury or trauma    Valerie Latham is a 51 y.o. female who presents today for evaluation of   Chief Complaint   Patient presents with   • Right Ankle - Pain         Subjective   Medications:   Home Medications:  Current Outpatient Medications on File Prior to Visit   Medication Sig   • ciprofloxacin (CIPRO) 500 MG tablet Take 500 mg by mouth 2 (Two) Times a Day.   • metroNIDAZOLE (FLAGYL) 500 MG tablet Take 500 mg by mouth 3 (Three) Times a Day.     No current facility-administered medications on file prior to visit.     Current Medications:  Scheduled Meds:  Continuous Infusions:No current facility-administered medications for this visit.    PRN Meds:.    I have reviewed the patient's medical history in detail and updated the computerized patient record.  Review and summarization of old records includes:    Past Medical History:   Diagnosis Date   • Back pain    • Chronic sinusitis of both maxillary sinuses    • Diverticulosis     Unsure   • Earache    • Fatigue    • Headache    • History of bronchitis    • History of chickenpox    •  Hypertension    • Palpitation    • Torn ligament     left ankle   • Wears glasses         Past Surgical History:   Procedure Laterality Date   • BARIATRIC SURGERY      Lap band   • COSMETIC SURGERY  06/15/2021    Lipo and tummy tuck   • HYSTERECTOMY     • HYSTERECTOMY  2012   • LAPAROSCOPIC GASTRIC BANDING     • LIPOMA EXCISION     • LIPOSUCTION ABDOMINAL Bilateral 12/23/2020    Procedure: LIPOSUCTION ABDOMEN, BACK, BRA FAT, MONS PUBIS;  Surgeon: Narendra Garcia MD PhD;  Location: [facility];  Service: New Image;  Laterality: Bilateral;   • MYOMECTOMY     • THIGH LIFT Bilateral 12/23/2020    Procedure: BILATERAL THIGHPLASTY;  Surgeon: Narendra Garcia MD PhD;  Location: [facility] OR;  Service: New Image;  Laterality: Bilateral;        Social History     Occupational History   • Not on file   Tobacco Use   • Smoking status: Never Smoker   • Smokeless tobacco: Never Used   Vaping Use   • Vaping Use: Never used   Substance and Sexual Activity   • Alcohol use: Yes     Comment: Minimal   • Drug use: Never   • Sexual activity: Not Currently     Partners: Male      Social History     Social History Narrative   • Not on file        Family History   Problem Relation Age of Onset   • Heart disease Father    • Hyperlipidemia Father    • Hypertension Father    • Diabetes Paternal Grandfather    • Malig Hyperthermia Neg Hx        ROS: 14 point review of systems was performed and all other systems were reviewed and are negative except for documented findings in HPI and today's encounter.     Allergies: No Known Allergies  Constitutional:  Denies fever, shaking or chills   Eyes:  Denies change in visual acuity   HENT:  Denies nasal congestion or sore throat   Respiratory:  Denies cough or shortness of breath   Cardiovascular:  Denies chest pain or severe LE edema   GI:  Denies abdominal pain, nausea, vomiting, bloody stools or diarrhea   Musculoskeletal:  Numbness, tingling, pain, or loss of motor function only  "as noted above in history of present illness.  : Denies painful urination or hematuria  Integument:  Denies rash, lesion or ulceration   Neurologic:  Denies headache or focal weakness  Endocrine:  Denies lymphadenopathy  Psych:  Denies confusion or change in mental status   Hem:  Denies active bleeding    OBJECTIVE:  Physical Exam: 51 y.o. female  Wt Readings from Last 3 Encounters:   06/02/22 101 kg (222 lb)   05/23/22 102 kg (224 lb)   05/09/22 102 kg (225 lb)     Ht Readings from Last 1 Encounters:   06/02/22 167.6 cm (66\")     Body mass index is 35.83 kg/m².  Vitals:    06/02/22 1550   Temp: 97.8 °F (36.6 °C)     Vital signs reviewed.     General Appearance:    Alert, cooperative, in no acute distress                  Eyes: conjunctiva clear  ENT: external ears and nose atraumatic  CV: no peripheral edema  Resp: normal respiratory effort  Skin: no rashes or wounds; normal turgor  Psych: mood and affect appropriate  Lymph: no nodes appreciated  Neuro: gross sensation intact  Vascular:  Palpable peripheral pulse in noted extremity  Musculoskeletal Extremities: Skin warm dry intact with good pulses mood sensation there is no point tenderness over the Achilles or deficit noted gastroc You test is intact skin is soft neurovascular intact has good pulses movement and sensation knee is nontender    Radiology:   Right ankle 3 views done for pain with no comparison films show some spurring over the calcaneus no fracture    Assessment:     ICD-10-CM ICD-9-CM   1. Right ankle pain, unspecified chronicity  M25.571 719.47   2. Heel spur, right  M77.31 726.73   3. Achilles tendinitis of right lower extremity  M76.61 726.71        Procedures    Elevated heel lift and follow-up as needed stretches for Achilles  MDM/Plan:   The diagnosis(es), natural history, pathophysiology and treatment for diagnosis(es) were discussed. Opportunity given and questions answered.  Biomechanics of pertinent body areas discussed.  When " appropriate, the use of ambulatory aids discussed.  EXERCISES:  Advice on benefits of, and types of regular/moderate exercise pertaining to orthopedic diagnosis(es).  MEDICATIONS:  The risks, benefits, warnings,side effects and alternatives of medications discussed.  DIABETES:  Diabetic counseling and how it affects the diagnosis and treatment  Inflammation/pain control; with cold, heat, elevation and/or liniments discussed as appropriate  HOME EXERCISE/PT program encouraged  MEDICAL RECORDS reviewed from other provider(s) for past and current medical history pertinent to this complaint.      6/2/2022    Much of this encounter note is an electronic transcription/translation of spoken language to printed text. The electronic translation of spoken language may permit erroneous, or at times, nonsensical words or phrases to be inadvertently transcribed; Although I have reviewed the note for such errors, some may still exist

## 2022-08-16 ENCOUNTER — APPOINTMENT (OUTPATIENT)
Dept: WOMENS IMAGING | Facility: HOSPITAL | Age: 52
End: 2022-08-16

## 2022-08-16 PROCEDURE — 77063 BREAST TOMOSYNTHESIS BI: CPT | Performed by: RADIOLOGY

## 2022-08-16 PROCEDURE — 77067 SCR MAMMO BI INCL CAD: CPT | Performed by: RADIOLOGY

## 2022-09-09 ENCOUNTER — OFFICE VISIT (OUTPATIENT)
Dept: INTERNAL MEDICINE | Facility: CLINIC | Age: 52
End: 2022-09-09

## 2022-09-09 VITALS
RESPIRATION RATE: 18 BRPM | OXYGEN SATURATION: 97 % | SYSTOLIC BLOOD PRESSURE: 137 MMHG | DIASTOLIC BLOOD PRESSURE: 89 MMHG | TEMPERATURE: 97.5 F | HEIGHT: 66 IN | WEIGHT: 231 LBS | BODY MASS INDEX: 37.12 KG/M2 | HEART RATE: 64 BPM

## 2022-09-09 DIAGNOSIS — Z12.11 COLON CANCER SCREENING: ICD-10-CM

## 2022-09-09 DIAGNOSIS — H93.13 TINNITUS OF BOTH EARS: ICD-10-CM

## 2022-09-09 DIAGNOSIS — R32 URINARY INCONTINENCE, UNSPECIFIED TYPE: ICD-10-CM

## 2022-09-09 DIAGNOSIS — E78.2 MIXED HYPERLIPIDEMIA: ICD-10-CM

## 2022-09-09 DIAGNOSIS — Z82.49 FH: CAD (CORONARY ARTERY DISEASE): Chronic | ICD-10-CM

## 2022-09-09 DIAGNOSIS — Z00.00 ANNUAL PHYSICAL EXAM: Primary | ICD-10-CM

## 2022-09-09 DIAGNOSIS — Z00.00 HEALTHCARE MAINTENANCE: ICD-10-CM

## 2022-09-09 LAB
BILIRUB UR QL STRIP: NEGATIVE
CLARITY UR: CLEAR
COLOR UR: YELLOW
GLUCOSE UR STRIP-MCNC: NEGATIVE MG/DL
HGB UR QL STRIP.AUTO: NEGATIVE
KETONES UR QL STRIP: NEGATIVE
LEUKOCYTE ESTERASE UR QL STRIP.AUTO: NEGATIVE
NITRITE UR QL STRIP: NEGATIVE
PH UR STRIP.AUTO: 6.5 [PH] (ref 5–8)
PROT UR QL STRIP: NEGATIVE
SP GR UR STRIP: 1.02 (ref 1–1.03)
UROBILINOGEN UR QL STRIP: NORMAL

## 2022-09-09 PROCEDURE — 99396 PREV VISIT EST AGE 40-64: CPT | Performed by: NURSE PRACTITIONER

## 2022-09-09 PROCEDURE — 81003 URINALYSIS AUTO W/O SCOPE: CPT | Performed by: NURSE PRACTITIONER

## 2022-09-09 RX ORDER — ESTRADIOL 0.1 MG/G
CREAM VAGINAL
COMMUNITY
Start: 2022-08-16

## 2022-09-09 NOTE — PROGRESS NOTES
"Chief Complaint  Annual Exam (Pt is having trouble with cholesterol and leaky bladder )    Subjective        Valerie Latham presents to [facility]  Patient presents for an annual physical.  This is a 52-year-old female.    She is a never smoker and drinks alcohol socially, not in excess.    She endorses a generally well-balanced diet and has been eating a high-protein, low carbohydrate diet.  She is trying get back into regular exercise.    Up-to-date on vision exam, needs to schedule dental exam.    Blood pressure is a bit elevated today at 137/89 and she endorses a high stress job and family history.    She has a history of diverticulitis, no recent flareups since May 2022 which was treated with Flagyl and Cipro.    She has a history of mild hyperlipidemia, last LDL 11/23/2020 was 116.    She has some urinary incontinence that seems to be worse during times of stress and fatigue.  No burning on urination but she has left a urine sample today.    She endorses chronic tinnitus, right worse than left that seems to be exacerbated by stress.  This has been a chronic issue but worsened recently.    Otherwise denies development of other new issues today.      Objective   Vital Signs:  /89 (BP Location: Left arm, Patient Position: Sitting, Cuff Size: Large Adult)   Pulse 64   Temp 97.5 °F (36.4 °C)   Resp 18   Ht 167.6 cm (66\")   Wt 105 kg (231 lb)   SpO2 97%   BMI 37.28 kg/m²   Estimated body mass index is 37.28 kg/m² as calculated from the following:    Height as of this encounter: 167.6 cm (66\").    Weight as of this encounter: 105 kg (231 lb).          Physical Exam  Vitals and nursing note reviewed.   Constitutional:       General: She is not in acute distress.     Appearance: Normal appearance. She is well-developed. She is obese. She is not ill-appearing, toxic-appearing or diaphoretic.   HENT:      Head: Normocephalic and atraumatic.      Right Ear: Tympanic membrane, " ear canal and external ear normal.      Left Ear: Tympanic membrane, ear canal and external ear normal.   Eyes:      Pupils: Pupils are equal, round, and reactive to light.   Neck:      Vascular: No carotid bruit.   Cardiovascular:      Rate and Rhythm: Normal rate and regular rhythm.      Pulses: Normal pulses.      Heart sounds: Normal heart sounds.      Comments: No peripheral edema  Pulmonary:      Effort: Pulmonary effort is normal. No respiratory distress.      Breath sounds: Normal breath sounds. No stridor. No wheezing, rhonchi or rales.   Chest:      Chest wall: No tenderness.   Abdominal:      General: Bowel sounds are normal. There is no distension.      Palpations: Abdomen is soft. There is no mass.      Tenderness: There is no abdominal tenderness. There is no right CVA tenderness, left CVA tenderness, guarding or rebound.      Hernia: No hernia is present.   Musculoskeletal:         General: Normal range of motion.      Cervical back: Normal range of motion and neck supple. No rigidity or tenderness.   Lymphadenopathy:      Cervical: No cervical adenopathy.   Skin:     General: Skin is warm and dry.      Capillary Refill: Capillary refill takes less than 2 seconds.   Neurological:      General: No focal deficit present.      Mental Status: She is alert and oriented to person, place, and time. Mental status is at baseline.   Psychiatric:         Mood and Affect: Mood normal.         Behavior: Behavior normal.         Thought Content: Thought content normal.         Judgment: Judgment normal.        Result Review :  The following data was reviewed by: SUMANTH White on 09/09/2022:      Lab Results   Component Value Date    GLUCOSE 91 05/18/2021    BUN 11 05/18/2021    CREATININE 0.69 05/18/2021    EGFRIFNONA 90 05/18/2021    EGFRIFAFRI 110 05/18/2021    BCR 15.9 05/18/2021    K 5.3 (H) 05/18/2021    CO2 31.4 (H) 05/18/2021    CALCIUM 10.3 05/18/2021    PROTENTOTREF 7.4 05/18/2021    ALBUMIN 5.00  05/18/2021    LABIL2 2.1 05/18/2021    AST 19 05/18/2021    ALT 16 05/18/2021       Current outpatient and discharge medications have been reconciled for the patient.  Reviewed by: SUMANTH White           Assessment and Plan   Diagnoses and all orders for this visit:    1. Annual physical exam (Primary)    2. Urinary incontinence, unspecified type  Comments:  Checking urinalysis with culture today.  If clear we will discuss urology referral/urogynecology if indicated.  Orders:  -     Cancel: Urinalysis With Microscopic If Indicated (No Culture) - Urine, Clean Catch  -     Urine Culture - Urine, Urine, Clean Catch  -     Urinalysis With Microscopic If Indicated (No Culture) - Urine, Clean Catch    3. FH: CAD (coronary artery disease)  Assessment & Plan:  Discussed the importance of good hyperlipidemia and hypertension control.      4. Healthcare maintenance  Assessment & Plan:  Acular exercise, goal of 30 minutes/day 4 to 5 days/week and well-balanced diet.    She follows with Dr. Ginny Galvez for mammogram, Pap smear and we will request those records.    Continue with regular dental and vision exams.      5. Mixed hyperlipidemia  Comments:  Discussed low-fat diet, fasting lipid panel today and we will adjust therapy if needed.  Orders:  -     CBC No Differential  -     Comprehensive metabolic panel  -     Lipid panel  -     TSH Rfx On Abnormal To Free T4    6. Colon cancer screening  Comments:  Order is placed for screening colonoscopy.  Orders:  -     Ambulatory Referral For Screening Colonoscopy    7. Tinnitus of both ears  Comments:  Checking multiple labs.  If normal she requests referral to ENT which I will place when labs are back.         We will contact patient with lab results and further recommendations.  Follow-up as needed and I will see her back in 6 months for recheck of hypertension.    Follow Up   Return in about 6 months (around 3/9/2023).  Patient was given instructions and counseling  regarding her condition or for health maintenance advice. Please see specific information pulled into the AVS if appropriate.

## 2022-09-09 NOTE — ASSESSMENT & PLAN NOTE
Acular exercise, goal of 30 minutes/day 4 to 5 days/week and well-balanced diet.    She follows with Dr. Ginny Galvez for mammogram, Pap smear and we will request those records.    Continue with regular dental and vision exams.

## 2022-09-09 NOTE — PROGRESS NOTES
Hi Ms. Latham, your urinalysis came back clear but we are still sending for culture.  I will get you those results back as soon as possible.

## 2022-09-10 LAB
ALBUMIN SERPL-MCNC: 4.8 G/DL (ref 3.8–4.9)
ALBUMIN/GLOB SERPL: 1.8 {RATIO} (ref 1.2–2.2)
ALP SERPL-CCNC: 56 IU/L (ref 44–121)
ALT SERPL-CCNC: 17 IU/L (ref 0–32)
AST SERPL-CCNC: 23 IU/L (ref 0–40)
BACTERIA UR CULT: NORMAL
BACTERIA UR CULT: NORMAL
BILIRUB SERPL-MCNC: 0.2 MG/DL (ref 0–1.2)
BUN SERPL-MCNC: 10 MG/DL (ref 6–24)
BUN/CREAT SERPL: 13 (ref 9–23)
CALCIUM SERPL-MCNC: 11 MG/DL (ref 8.7–10.2)
CHLORIDE SERPL-SCNC: 102 MMOL/L (ref 96–106)
CHOLEST SERPL-MCNC: 213 MG/DL (ref 100–199)
CO2 SERPL-SCNC: 19 MMOL/L (ref 20–29)
CREAT SERPL-MCNC: 0.8 MG/DL (ref 0.57–1)
EGFRCR-CYS SERPLBLD CKD-EPI 2021: 89 ML/MIN/1.73
ERYTHROCYTE [DISTWIDTH] IN BLOOD BY AUTOMATED COUNT: 14.4 % (ref 11.7–15.4)
GLOBULIN SER CALC-MCNC: 2.6 G/DL (ref 1.5–4.5)
GLUCOSE SERPL-MCNC: 95 MG/DL (ref 65–99)
HCT VFR BLD AUTO: 44.5 % (ref 34–46.6)
HDLC SERPL-MCNC: 33 MG/DL
HGB BLD-MCNC: 15.4 G/DL (ref 11.1–15.9)
LDLC SERPL CALC-MCNC: 130 MG/DL (ref 0–99)
MCH RBC QN AUTO: 31.2 PG (ref 26.6–33)
MCHC RBC AUTO-ENTMCNC: 34.6 G/DL (ref 31.5–35.7)
MCV RBC AUTO: 90 FL (ref 79–97)
PLATELET # BLD AUTO: 323 X10E3/UL (ref 150–450)
POTASSIUM SERPL-SCNC: 4.5 MMOL/L (ref 3.5–5.2)
PROT SERPL-MCNC: 7.4 G/DL (ref 6–8.5)
RBC # BLD AUTO: 4.93 X10E6/UL (ref 3.77–5.28)
SODIUM SERPL-SCNC: 140 MMOL/L (ref 134–144)
TRIGL SERPL-MCNC: 279 MG/DL (ref 0–149)
TSH SERPL DL<=0.005 MIU/L-ACNC: 1.47 UIU/ML (ref 0.45–4.5)
VLDLC SERPL CALC-MCNC: 50 MG/DL (ref 5–40)
WBC # BLD AUTO: 8.3 X10E3/UL (ref 3.4–10.8)

## 2022-09-16 DIAGNOSIS — H93.13 TINNITUS OF BOTH EARS: ICD-10-CM

## 2022-09-16 DIAGNOSIS — E83.52 HYPERCALCEMIA: Primary | ICD-10-CM

## 2022-09-16 DIAGNOSIS — R32 URINARY INCONTINENCE, UNSPECIFIED TYPE: Primary | ICD-10-CM

## 2022-09-16 NOTE — PROGRESS NOTES
Good morning Ms. Latham, labs are back.  Urine culture is negative for UTI.  Given that you have been having persistent urinary symptoms, I think it might be worthwhile to have a urologist or urogynecologist examine you to further investigate the cause of your symptoms.  If you would like to proceed with that referral please let me know and I can get the orders placed.  Your blood count is normal with no anemia, normal white blood cells and platelets.  Kidney function, liver enzymes are normal.  You have a slightly elevated calcium level.  I would like to recheck this within the next few weeks and I will have the office call you to schedule a nonfasting lab appointment.  Your total cholesterol and LDL cholesterol are both a bit higher than last check, goal for LDL cholesterol is less than 100 and yours is 130, please continue with a low fat well-balanced diet and regular exercise.  Triglycerides are quite high at 279, goal is less than 150.  Please start taking omega-3 fish oil 1000 mg daily over-the-counter to help lower triglycerides along with low-fat diet and exercise.  We will recheck routinely.  Thyroid numbers look good.  Through these labs I do not identify an etiology for the ringing in your ears.  As we discussed I will go ahead and get a referral placed to ENT for further evaluation of the tinnitus.  Let me know if you have any questions and have a great day,    SUMANTH White

## 2022-09-16 NOTE — PROGRESS NOTES
I sent the patient a MyChart note explaining her labs but please give her a call to get her scheduled for a 2 to 3-week nonfasting lab appointment for recheck of high calcium.

## 2022-10-21 ENCOUNTER — TELEPHONE (OUTPATIENT)
Dept: INTERNAL MEDICINE | Facility: CLINIC | Age: 52
End: 2022-10-21

## 2022-10-21 NOTE — TELEPHONE ENCOUNTER
Hub staff attempted to follow warm transfer process and was unsuccessful     Caller: Valerie Latham    Relationship to patient: Self    Best call back number: [phone]    Patient is needing: PATIENT IS CALLING IN REGARDS TO HER EXISTING REFERRAL TO ENT. PATIENT STATED THAT SHE CALLED YESTERDAY AND WAS GIVEN A PHONE NUMBER TO THE ENT BUT IT WAS ACTUALLY THE FAX NUMBER. PATIENT WAS CALLING FOR THE CORRECT PHONE NUMBER. PLEASE ADVISE.

## 2022-11-11 ENCOUNTER — TELEPHONE (OUTPATIENT)
Dept: INTERNAL MEDICINE | Facility: CLINIC | Age: 52
End: 2022-11-11

## 2022-11-11 NOTE — TELEPHONE ENCOUNTER
Pt called and was in pain and upset there was no availability, suggested urgent care if symptoms get any worse or to hold her over until her appointment 11/18/22.

## 2022-11-11 NOTE — TELEPHONE ENCOUNTER
This is usually given for diverticulitis, but we would need to see and evaluate her to make sure we are making the right diagnosis, could we get her scheduled?

## 2022-11-11 NOTE — TELEPHONE ENCOUNTER
Caller: Valerie Latham    Relationship: Self    Best call back number: [phone]    Requested Prescriptions:   Requested Prescriptions      No prescriptions requested or ordered in this encounter    METRONIDAVOLE  500MG -   CIPROFLOXACIN 500 MGS - 3 TABLETS DAILY      Pharmacy where request should be sent: [id number] [place], [place] - [address] AT [facility] [phone] [facility] [phone]      Additional details provided by patient: THESE MEDICATIONS WAS PRESCRIBE BY DR. CARDOZA IN [facility], WHILE SUMANTH BARRERA WAS ON LEAVE I SAW ROMI ORDAZ  ABOUT THIS BOWEL PROBLEMS    Does the patient have less than a 3 day supply:  [x] Yes  [] No    Shai Osei Rep   11/11/22 09:25 EST

## 2022-11-18 ENCOUNTER — TELEPHONE (OUTPATIENT)
Dept: INTERNAL MEDICINE | Facility: CLINIC | Age: 52
End: 2022-11-18

## 2022-11-18 ENCOUNTER — OFFICE VISIT (OUTPATIENT)
Dept: INTERNAL MEDICINE | Facility: CLINIC | Age: 52
End: 2022-11-18

## 2022-11-18 VITALS
HEIGHT: 66 IN | TEMPERATURE: 97.9 F | DIASTOLIC BLOOD PRESSURE: 84 MMHG | WEIGHT: 231.6 LBS | HEART RATE: 66 BPM | OXYGEN SATURATION: 97 % | BODY MASS INDEX: 37.22 KG/M2 | SYSTOLIC BLOOD PRESSURE: 128 MMHG

## 2022-11-18 DIAGNOSIS — Z76.89 ENCOUNTER TO ESTABLISH CARE: Primary | ICD-10-CM

## 2022-11-18 DIAGNOSIS — K57.90 DIVERTICULOSIS: Chronic | ICD-10-CM

## 2022-11-18 PROCEDURE — 99213 OFFICE O/P EST LOW 20 MIN: CPT | Performed by: NURSE PRACTITIONER

## 2022-11-18 RX ORDER — FLUOCINOLONE ACETONIDE 0.11 MG/ML
OIL AURICULAR (OTIC)
COMMUNITY
Start: 2022-11-14

## 2022-11-18 NOTE — PROGRESS NOTES
Chief Complaint  Diverticulitis (Establish care )     Subjective:      History of Present Illness {CC  Problem List  Visit  Diagnosis   Encounters  Notes  Medications  Labs  Result Review Imaging  Media :23}     Valerie Latham is a patient of Martha Spear III, NP-C and presents to [facility] for     A Mathew is leaving the practice and she is here to establish with me today.     She has hx of diverticulitis, uti, chronic tinnitus (she was referred to ENT 9/2022 - states was given drops and told needs hearing aids), mild hyperlipidemia, urinary incontinence (referred to urology 9/2022 -she was advised Kegels and getting better)      5/24/21: CT showed 1 cm hypoattenuating lesion on upper pole of left kidney and advised follow up in 6-12 months.     11/11/2022: UC for left lower abd pain: diverticulitis and treated with flagyl and cipro.   She is due for colonoscopy and has paper work at home to complete and schedule.     Triggers: luther [employer]       GYN: [facility]: Mammogram in September       I have reviewed patient's medical history, any new submitted information provided by patient or medical assistant and updated medical record.      Objective:      Physical Exam  Vitals reviewed.   Constitutional:       Appearance: Normal appearance. She is well-developed.   HENT:      Head:      Comments: Wearing mask due to COVID   Neck:      Thyroid: No thyromegaly.   Cardiovascular:      Rate and Rhythm: Normal rate and regular rhythm.      Pulses: Normal pulses.      Heart sounds: Normal heart sounds.   Pulmonary:      Effort: Pulmonary effort is normal.      Breath sounds: Normal breath sounds.      Comments: E/U   Abdominal:      General: Bowel sounds are normal.      Palpations: Abdomen is soft. There is no mass.   Musculoskeletal:      Cervical back: Normal range of motion and neck supple.   Lymphadenopathy:      Cervical: No cervical  "adenopathy.   Skin:     General: Skin is warm and dry.   Neurological:      Mental Status: She is alert and oriented to person, place, and time.   Psychiatric:         Mood and Affect: Mood normal.         Behavior: Behavior normal. Behavior is cooperative.         Thought Content: Thought content normal.         Judgment: Judgment normal.        Result Review  Data Reviewed:{ Labs  Result Review  Imaging  Med Tab  Media :23}     The following data was reviewed by: Martha Spear III, NP-C on 11/18/2022  Common labs    Common Labs 9/9/22 9/9/22 9/9/22 10/28/22    [id number] [id number]    Glucose  95     BUN  10     Creatinine  0.80     Sodium  140     Potassium  4.5     Chloride  102     Calcium  11.0 (A)  9.7   Total Protein  7.4     Albumin  4.8     Total Bilirubin  0.2     Alkaline Phosphatase  56     AST (SGOT)  23     ALT (SGPT)  17     WBC 8.3      Hemoglobin 15.4      Hematocrit 44.5      Platelets 323      Total Cholesterol   213 (A)    Triglycerides   279 (A)    HDL Cholesterol   33 (A)    LDL Cholesterol    130 (A)    (A) Abnormal value                   Vital Signs:   /84 (BP Location: Left arm, Patient Position: Sitting, Cuff Size: Adult)   Pulse 66   Temp 97.9 °F (36.6 °C) (Temporal)   Ht 167.6 cm (66\")   Wt 105 kg (231 lb 9.6 oz)   SpO2 97%   BMI 37.38 kg/m²         Requested Prescriptions      No prescriptions requested or ordered in this encounter       Routine medications provided by this office will also be refilled via pharmacy request.       Current Outpatient Medications:   •  ciprofloxacin (CIPRO) 500 MG tablet, Take 1 tablet by mouth Every 12 (Twelve) Hours for 10 days., Disp: 20 tablet, Rfl: 0  •  estradiol (ESTRACE) 0.1 MG/GM vaginal cream, INSERT 0.5 GRAMS INTO THE VAGINA AS DIRECTED, Disp: , Rfl:   •  fluocinolone acetonide (DERMOTIC) 0.01 % oil otic oil, , Disp: , Rfl:   •  metroNIDAZOLE (FLAGYL) 500 MG tablet, Take 1 tablet by mouth 3 (Three) Times a Day for 10 " days., Disp: 30 tablet, Rfl: 0     Assessment and Plan:      Assessment and Plan {CC Problem List  Visit Diagnosis  ROS  Review (Popup)  Health Maintenance  Quality  BestPractice  Medications  SmartSets  SnapShot Encounters  Media :23}     Problem List Items Addressed This Visit        Gastrointestinal Abdominal     Diverticulosis (Chronic)    Overview     Advised life long high fiber diet.         Other Visit Diagnoses     Encounter to establish care    -  Primary        Schedule colonoscopy.   Will get her mammogram report.     I spent 24 minutes caring for Valerie on this date of service. This time includes time spent by me in the following activities: preparing for the visit, reviewing tests, obtaining and/or reviewing a separately obtained history, performing a medically appropriate examination and/or evaluation and documenting information in the medical record    Follow Up {Instructions Charge Capture  Follow-up Communications :23}     Return in about 10 months (around 9/14/2023) for Annual physical.    Follow up with PCP as scheduled.     Patient was given instructions and counseling regarding her condition or for health maintenance advice. Please see specific information pulled into the AVS if appropriate.    Dragon disclaimer:   Much of this encounter note is an electronic transcription/translation of spoken language to printed text. The electronic translation of spoken language may permit erroneous, or at times, nonsensical words or phrases to be inadvertently transcribed; Although I have reviewed the note for such errors, some may still exist.     Additional Patient Counseling:       Patient Instructions   Diet:    • Eat vegetables, fruits, whole grain, low-fat dairy, poultry, fish, beans, nontropical vegetable oils, and nuts, but avoid red meat (i.e., Mediterranean-style diet, DASH [Dietary Approaches to Stop Hypertension] diet).  • Limit sugary drinks and sweets.  • Limit saturated and  trans fat to 5% to 6% of calories.  • Limit sodium intake to 2,400 mg daily (about one teaspoon table salt [kosher/sea salt have less sodium per teaspoon]).  Weight loss / Calorie Counting Apps:    • Lose It!   • MyFitness Pal   • Works great when you try it with a partner/ friend  Exercise:   • Engage in moderate-to-vigorous aerobic activity for at least 40 minutes (on average) three to four times each week.  Wearables:   • Activity tracker   • Step tracker   Skin Care:   • Use sun screen SPF >30 daily  • Dermatologist for skin check regularly  Bone Health:   • [email or web address]    CDC recommends Flu vaccines for everyone 6 months and older every season with rare exceptions.

## 2022-11-18 NOTE — TELEPHONE ENCOUNTER
----- Message from DAVID Pulido III sent at 11/18/2022  9:57 AM EST -----  Please call [facility] and get copy of recent mammogram

## 2022-11-18 NOTE — PATIENT INSTRUCTIONS
Diet:    Eat vegetables, fruits, whole grain, low-fat dairy, poultry, fish, beans, nontropical vegetable oils, and nuts, but avoid red meat (i.e., Mediterranean-style diet, DASH [Dietary Approaches to Stop Hypertension] diet).  Limit sugary drinks and sweets.  Limit saturated and trans fat to 5% to 6% of calories.  Limit sodium intake to 2,400 mg daily (about one teaspoon table salt [kosher/sea salt have less sodium per teaspoon]).  Weight loss / Calorie Counting Apps:    Lose It!   Mingly Pal   Works great when you try it with a partner/ friend  Exercise:   Engage in moderate-to-vigorous aerobic activity for at least 40 minutes (on average) three to four times each week.  Wearables:   Activity tracker   Step tracker   Skin Care:   Use sun screen SPF >30 daily  Dermatologist for skin check regularly  Bone Health:   [email or web address]    CDC recommends Flu vaccines for everyone 6 months and older every season with rare exceptions.

## 2022-11-18 NOTE — TELEPHONE ENCOUNTER
Called [facility] spoke to miracle and she is faxing mammogram report to [phone] attention to dottie.     SUMIT Pearl

## 2023-01-03 ENCOUNTER — TELEPHONE (OUTPATIENT)
Dept: INTERNAL MEDICINE | Facility: CLINIC | Age: 53
End: 2023-01-03
Payer: COMMERCIAL

## 2023-01-03 DIAGNOSIS — Z12.11 COLON CANCER SCREENING: Primary | ICD-10-CM

## 2023-01-03 NOTE — TELEPHONE ENCOUNTER
Caller: Valerie Latham    Relationship: Self    Best call back number: [phone]    What is the medical concern/diagnosis: DIVERTICULITIS     What specialty or service is being requested: COLONOSCOPY     Any additional details:NEEDS A NEW REFERRAL FOR COLONOSCOPY BECAUSE ORIGINAL ONE THE DOCTOR IS NO LONGER PRACTICING AND ALSO WANTS A REFERRAL TO BE LOOKED AT [facility] DIVERTICULITIS

## 2023-01-10 ENCOUNTER — OFFICE VISIT (OUTPATIENT)
Dept: GASTROENTEROLOGY | Facility: CLINIC | Age: 53
End: 2023-01-10
Payer: COMMERCIAL

## 2023-01-10 VITALS
OXYGEN SATURATION: 98 % | TEMPERATURE: 97.9 F | SYSTOLIC BLOOD PRESSURE: 139 MMHG | DIASTOLIC BLOOD PRESSURE: 83 MMHG | BODY MASS INDEX: 37.22 KG/M2 | HEIGHT: 66 IN | HEART RATE: 57 BPM | WEIGHT: 231.6 LBS

## 2023-01-10 DIAGNOSIS — Z12.12 SCREENING FOR COLORECTAL CANCER: ICD-10-CM

## 2023-01-10 DIAGNOSIS — Z87.19 HISTORY OF DIVERTICULITIS: Primary | ICD-10-CM

## 2023-01-10 DIAGNOSIS — Z12.11 SCREENING FOR COLORECTAL CANCER: ICD-10-CM

## 2023-01-10 PROCEDURE — 99203 OFFICE O/P NEW LOW 30 MIN: CPT | Performed by: INTERNAL MEDICINE

## 2023-01-10 RX ORDER — CIPROFLOXACIN 500 MG/1
500 TABLET, FILM COATED ORAL 2 TIMES DAILY
COMMUNITY

## 2023-01-10 RX ORDER — METRONIDAZOLE 500 MG/1
500 TABLET ORAL 3 TIMES DAILY
COMMUNITY

## 2023-01-10 NOTE — PROGRESS NOTES
Chief Complaint   Patient presents with   • HX Diverticulitis        Valerie Latham is a  52 y.o. female here for an initial visit for history of diverticulitis, screening for colorectal cancer.    HPI this 52-year-old white female patient of DAVID Santizo presents with a history of recurrent abdominal pain attributed to possible diverticular disease.  She states the initial event that happened and May 2021 had been studied with a scan in March of that same year with negative findings.  She has not had any further scans done but had another bout in May 2022 and more recently in November 2022.  She has had issues with abdominal pain as recently as the last 10 days.  She has been treated in the past empirically with antibiotics but has never been formally diagnosed.  We talked about further studies to start with a CT of the abdomen and pelvis as well as a CBC.  She warrants colonoscopic examination once her acute issues have improved but if her symptoms progress she may warrant referral to the hospital for more aggressive therapy.  We talked about a low residue diet and avoidance of any foods that may precipitate symptoms.    Past Medical History:   Diagnosis Date   • Back pain    • Chronic sinusitis of both maxillary sinuses    • Diverticulitis of colon    • Diverticulosis     Unsure   • Earache    • Fatigue    • Headache    • History of bronchitis    • History of chickenpox    • Hypertension    • Palpitation    • Torn ligament     left ankle   • Wears glasses        Current Outpatient Medications   Medication Sig Dispense Refill   • ciprofloxacin (CIPRO) 500 MG tablet Take 500 mg by mouth 2 (Two) Times a Day.     • estradiol (ESTRACE) 0.1 MG/GM vaginal cream INSERT 0.5 GRAMS INTO THE VAGINA AS DIRECTED     • fluocinolone acetonide (DERMOTIC) 0.01 % oil otic oil      • metroNIDAZOLE (FLAGYL) 500 MG tablet Take 500 mg by mouth 3 (Three) Times a Day.       No current facility-administered  medications for this visit.       PRN Meds:.    No Known Allergies    Social History     Socioeconomic History   • Marital status:    Tobacco Use   • Smoking status: Never   • Smokeless tobacco: Never   Vaping Use   • Vaping Use: Never used   Substance and Sexual Activity   • Alcohol use: Yes     Comment: Minimal   • Drug use: Never   • Sexual activity: Not Currently     Partners: Male       Family History   Problem Relation Age of Onset   • Heart disease Father    • Hyperlipidemia Father    • Hypertension Father    • Diabetes Paternal Grandfather    • Malig Hyperthermia Neg Hx        Review of Systems   Constitutional: Negative for activity change, appetite change, fatigue and unexpected weight change.   HENT: Negative for congestion, facial swelling, sore throat, trouble swallowing and voice change.    Eyes: Negative for photophobia and visual disturbance.   Respiratory: Negative for cough and choking.    Cardiovascular: Negative for chest pain.   Gastrointestinal: Positive for abdominal pain. Negative for abdominal distention, anal bleeding, blood in stool, constipation, diarrhea, nausea, rectal pain and vomiting.   Endocrine: Negative for polyphagia.   Musculoskeletal: Negative for arthralgias, gait problem and joint swelling.   Skin: Negative for color change, pallor and rash.   Allergic/Immunologic: Negative for food allergies.   Neurological: Negative for speech difficulty and headaches.   Hematological: Does not bruise/bleed easily.   Psychiatric/Behavioral: Negative for agitation, confusion and sleep disturbance.       Vitals:    01/10/23 1600   BP: 139/83   Pulse: 57   Temp: 97.9 °F (36.6 °C)   SpO2: 98%       Physical Exam  Vitals reviewed.   Constitutional:       General: She is not in acute distress.     Appearance: She is well-developed. She is not diaphoretic.   HENT:      Head: Normocephalic.      Mouth/Throat:      Pharynx: No oropharyngeal exudate.   Eyes:      General: No scleral  icterus.     Conjunctiva/sclera: Conjunctivae normal.   Neck:      Thyroid: No thyromegaly.   Cardiovascular:      Rate and Rhythm: Normal rate and regular rhythm.      Heart sounds: No murmur heard.  Pulmonary:      Effort: No respiratory distress.      Breath sounds: Normal breath sounds. No wheezing or rales.   Abdominal:      General: Bowel sounds are normal. There is no distension.      Palpations: Abdomen is soft. There is no mass.      Tenderness: There is no abdominal tenderness.   Musculoskeletal:         General: No tenderness. Normal range of motion.      Cervical back: Normal range of motion.   Lymphadenopathy:      Cervical: No cervical adenopathy.   Skin:     General: Skin is warm and dry.      Findings: No erythema or rash.   Neurological:      Mental Status: She is alert and oriented to person, place, and time.   Psychiatric:         Behavior: Behavior normal.         ASSESSMENT   #1 abdominal pain  #2 potential diverticulitis        PLAN  CT scan of the abdomen and pelvis  CBC  Pending results consider empiric antibiotic treatment  Eventual colonoscopic evaluation when acute issues have improved or if symptoms progress referral to the hospital for empiric inpatient treatment      ICD-10-CM ICD-9-CM   1. History of diverticulitis  Z87.19 V12.70   2. Screening for colorectal cancer  Z12.11 V76.51    Z12.12 V76.41

## 2023-01-11 ENCOUNTER — TELEPHONE (OUTPATIENT)
Dept: GASTROENTEROLOGY | Facility: CLINIC | Age: 53
End: 2023-01-11

## 2023-01-11 DIAGNOSIS — Z87.19 HISTORY OF DIVERTICULITIS: Primary | ICD-10-CM

## 2023-01-11 DIAGNOSIS — R10.9 ABDOMINAL PAIN, UNSPECIFIED ABDOMINAL LOCATION: ICD-10-CM

## 2023-01-11 NOTE — TELEPHONE ENCOUNTER
Hub staff attempted to follow warm transfer process and was unsuccessful     Caller: Valerie Latham    Relationship to patient: Self    Best call back number: [phone]    Patient is needing: ORDERS FOR LABS AND CT PER PROGRESS NOTES FROM HER VISIT 1/10/23.     PATIENT IS IN PAIN AND WOULD LIKE TO GET THIS TAKEN CARE OF ASAP. PLEASE CALL PATIENT TO ARRANGE HOW SHE WILL RECEIVE THE ORDERS. SHE GIVES PERMISSION TO LEAVE A VOICEMAIL.

## 2023-01-11 NOTE — TELEPHONE ENCOUNTER
Called pt and pt is needing lab order and ct order placed so she can get these done.  Advised pt that the cbc and ct scan orders are placed and she can call [facility] to arrange. Verb understanding.  Pt is going to get cbc done at [facility].

## 2023-01-13 LAB
BASOPHILS # BLD AUTO: 0.1 X10E3/UL (ref 0–0.2)
BASOPHILS NFR BLD AUTO: 1 %
EOSINOPHIL # BLD AUTO: 0.1 X10E3/UL (ref 0–0.4)
EOSINOPHIL NFR BLD AUTO: 2 %
ERYTHROCYTE [DISTWIDTH] IN BLOOD BY AUTOMATED COUNT: 14 % (ref 11.7–15.4)
HCT VFR BLD AUTO: 44.2 % (ref 34–46.6)
HGB BLD-MCNC: 14.9 G/DL (ref 11.1–15.9)
IMM GRANULOCYTES # BLD AUTO: 0 X10E3/UL (ref 0–0.1)
IMM GRANULOCYTES NFR BLD AUTO: 0 %
LYMPHOCYTES # BLD AUTO: 1.8 X10E3/UL (ref 0.7–3.1)
LYMPHOCYTES NFR BLD AUTO: 20 %
MCH RBC QN AUTO: 31.1 PG (ref 26.6–33)
MCHC RBC AUTO-ENTMCNC: 33.7 G/DL (ref 31.5–35.7)
MCV RBC AUTO: 92 FL (ref 79–97)
MONOCYTES # BLD AUTO: 0.6 X10E3/UL (ref 0.1–0.9)
MONOCYTES NFR BLD AUTO: 6 %
NEUTROPHILS # BLD AUTO: 6.6 X10E3/UL (ref 1.4–7)
NEUTROPHILS NFR BLD AUTO: 71 %
PLATELET # BLD AUTO: 317 X10E3/UL (ref 150–450)
RBC # BLD AUTO: 4.79 X10E6/UL (ref 3.77–5.28)
WBC # BLD AUTO: 9.2 X10E3/UL (ref 3.4–10.8)

## 2023-01-23 ENCOUNTER — HOSPITAL ENCOUNTER (OUTPATIENT)
Dept: CT IMAGING | Facility: HOSPITAL | Age: 53
Discharge: HOME | End: 2023-01-23
Admitting: INTERNAL MEDICINE
Payer: COMMERCIAL

## 2023-01-23 PROCEDURE — 74177 CT ABD & PELVIS W/CONTRAST: CPT

## 2023-01-23 PROCEDURE — 0 DIATRIZOATE MEGLUMINE & SODIUM PER 1 ML: Performed by: INTERNAL MEDICINE

## 2023-01-23 PROCEDURE — 25010000002 IOPAMIDOL 61 % SOLUTION: Performed by: INTERNAL MEDICINE

## 2023-01-23 RX ADMIN — DIATRIZOATE MEGLUMINE AND DIATRIZOATE SODIUM 30 ML: 660; 100 LIQUID ORAL; RECTAL at 16:40

## 2023-01-23 RX ADMIN — IOPAMIDOL 100 ML: 612 INJECTION, SOLUTION INTRAVENOUS at 17:48

## 2023-01-25 ENCOUNTER — TELEPHONE (OUTPATIENT)
Dept: GASTROENTEROLOGY | Facility: CLINIC | Age: 53
End: 2023-01-25
Payer: COMMERCIAL

## 2023-01-25 NOTE — TELEPHONE ENCOUNTER
----- Message from Tavares PAEZ MD sent at 1/15/2023  3:38 PM EST -----  Regarding: CBC results  Okay to notify patient CBC was normal.  Awaiting CT scan results.  ----- Message -----  From: Mable Reflab Results In  Sent: 1/13/2023   9:13 AM EST  To: Tavares PAEZ MD

## 2023-01-26 NOTE — TELEPHONE ENCOUNTER
Called pt and advised that Dr Arreguin is out of the office but will send message to Koki FERGUSON for ct results. Verb understanding.

## 2023-01-26 NOTE — TELEPHONE ENCOUNTER
Hub staff attempted to follow warm transfer process and was unsuccessful     Caller: Valerie Latham    Relationship to patient: Self    Best call back number: [phone]    Patient is needing: PT CALLED. SHE SAID SHE WAS WAITING ON A CALL FOR HER TEST RESULTS. HUB UNABLE TO RELY MESSAGE. PLEASE CALL PT BACK.

## 2023-01-27 ENCOUNTER — PREP FOR SURGERY (OUTPATIENT)
Dept: OTHER | Facility: HOSPITAL | Age: 53
End: 2023-01-27
Payer: COMMERCIAL

## 2023-01-27 NOTE — TELEPHONE ENCOUNTER
Please call the patient and let her know her CT scan did show a stable lesion of the right kidney.  Small hiatal hernia with thickening of the distal esophagus.  Diverticulosis noted.  Lymph nodes are mildly prominent but similar to previous.  She does have a 3.5 cm left ovarian cyst.  Her liver is enlarged with evidence of fatty liver disease.     If she has not already had an EGD recently I would say due to the thickening of the distal esophagus she would warrant an EGD for further evaluation.  Would recommend a high-fiber diet for her diverticulosis.  Would follow-up with her GYN about her ovarian cyst and I would follow-up with her PCP or nephrology about her renal lesion.  And we can discuss further her fatty liver disease.      Called pt and advised of the above.   Verb understanding.   Pt would like to proceed with egd     Pt currently has case request for c/s .  Case request modified and egd added.     Update sent to Koki FERGUSON and scheduling.

## 2023-01-30 ENCOUNTER — TELEPHONE (OUTPATIENT)
Dept: GASTROENTEROLOGY | Facility: CLINIC | Age: 53
End: 2023-01-30
Payer: COMMERCIAL

## 2023-01-30 NOTE — TELEPHONE ENCOUNTER
----- Message from Tavares PAEZ MD sent at 1/28/2023  3:36 PM EST -----  Regarding: CT result  Okay to call results, recommend colonoscopy in 4 to 6 weeks.  ----- Message -----  From: Mable, Rad Results Jay In  Sent: 1/24/2023   8:39 AM EST  To: Tavares PAEZ MD

## 2023-01-30 NOTE — TELEPHONE ENCOUNTER
Called pt and per ct scan advised that it showed no acute intra abd process. ADvised that her left renal lesion is stable. Advised it showed fatty liver and colon diverticulosis.      Advised of Dr Arreguin's recommendations.  Verb understanding.     Pt reports that the ct scan did show esophageal thickening. She is asking if she should also have egd when having c/s.  Also asking if getting the scopes done on 02/20 is soon enough.  Pt also asking her abd scar tissue could be causing her discomfort. Advised will send questions to Dr Arreguin. Verb understanding.

## 2023-02-20 ENCOUNTER — OUTSIDE FACILITY SERVICE (OUTPATIENT)
Dept: GASTROENTEROLOGY | Facility: CLINIC | Age: 53
End: 2023-02-20
Payer: COMMERCIAL

## 2023-02-20 PROCEDURE — 45378 DIAGNOSTIC COLONOSCOPY: CPT | Performed by: INTERNAL MEDICINE

## 2023-09-14 ENCOUNTER — OFFICE VISIT (OUTPATIENT)
Dept: INTERNAL MEDICINE | Facility: CLINIC | Age: 53
End: 2023-09-14
Payer: COMMERCIAL

## 2023-09-14 VITALS
BODY MASS INDEX: 33.33 KG/M2 | WEIGHT: 207.4 LBS | HEART RATE: 51 BPM | HEIGHT: 66 IN | SYSTOLIC BLOOD PRESSURE: 122 MMHG | OXYGEN SATURATION: 98 % | DIASTOLIC BLOOD PRESSURE: 82 MMHG

## 2023-09-14 DIAGNOSIS — K57.90 DIVERTICULOSIS: Chronic | ICD-10-CM

## 2023-09-14 DIAGNOSIS — K76.0 HEPATIC STEATOSIS: ICD-10-CM

## 2023-09-14 DIAGNOSIS — Z00.00 ANNUAL PHYSICAL EXAM: Primary | ICD-10-CM

## 2023-09-14 PROCEDURE — 99396 PREV VISIT EST AGE 40-64: CPT | Performed by: NURSE PRACTITIONER

## 2023-09-14 RX ORDER — ERGOCALCIFEROL 1.25 MG/1
CAPSULE ORAL
COMMUNITY
Start: 2023-07-24

## 2023-09-14 RX ORDER — PROGESTERONE 200 MG/1
CAPSULE ORAL
COMMUNITY
Start: 2023-04-01

## 2023-09-14 RX ORDER — VIT C/B6/B5/MAGNESIUM/HERB 173 50-5-6-5MG
CAPSULE ORAL
COMMUNITY
Start: 2023-05-01

## 2023-09-14 RX ORDER — NYSTATIN 100000 U/G
CREAM TOPICAL SEE ADMIN INSTRUCTIONS
COMMUNITY
Start: 2023-09-12

## 2023-09-14 NOTE — PROGRESS NOTES
Chief Complaint  Annual Exam     Subjective:      History of Present Illness {CC  Problem List  Visit  Diagnosis   Encounters  Notes  Medications  Labs  Result Review Imaging  Media :23}     Valerie Latham presents to [facility] for:  annual exam     No new concerns - feels well.     Elimination diet  Hx lap band     Hepatic steatosis     Sleeps with mouth guard     She has quit her job.     company     Valerie is here for coordination of medical care, to discuss health maintenance, disease prevention as well as to followup on medical problems.     Patient Care Team:  Martha Spear III, NP-C as PCP - General (Internal Medicine)  Tavares Villa MD as Surgeon (Orthopedic Surgery)  Jose Angel Beatty MD as Surgeon (Orthopedic Surgery)  Ginny Galvez MD as Consulting Physician (Obstetrics and Gynecology)     Activity level is moderate.    Pickle ball few times a week and plans to increase when she stops working.       Weight trend is     Wt Readings from Last 4 Encounters:   09/14/23 94.1 kg (207 lb 6.4 oz)   01/10/23 105 kg (231 lb 9.6 oz)   11/18/22 105 kg (231 lb 9.6 oz)   11/11/22 103 kg (228 lb)         Health Maintenance Female:    GYN: [facility]   Last gynecology appointment:   Patient's last mammogram was normal per patient: will request report.   Advised routine self-breast exams monthly.    Colon cancer screen:   She has no change in bowel habits.   Hx diverticulitis:   Patient's last colonoscopy was 2/20/2023.   Advised high fiber diet.   She was advised to repeat in 10 years.    Vaccines: updated COVID vaccine to be out next week.      Last eye exam: UTD     Dentist routinely.     Advised regular sunscreen.        Her cardiovascular risks are:     [] No Known risk factors    [] Hypertension   [] Hyperlipidemia  [] Diabetes    [] Obesity  [] Family history   [] Current or hx tobacco use  [] Sedentary lifestyle   []  Post-menopausal      I have reviewed patient's medical history, any new submitted information provided by patient or medical assistant and updated medical record.      Objective:      Physical Exam  Vitals reviewed.   Constitutional:       Appearance: She is well-developed.   HENT:      Head: Normocephalic.      Nose: Nose normal.      Mouth/Throat:      Pharynx: Uvula midline.   Eyes:      Conjunctiva/sclera: Conjunctivae normal.      Pupils: Pupils are equal, round, and reactive to light.   Neck:      Thyroid: No thyromegaly.   Cardiovascular:      Rate and Rhythm: Normal rate and regular rhythm.      Pulses: Normal pulses.      Heart sounds: Normal heart sounds, S1 normal and S2 normal. No murmur heard.  Pulmonary:      Effort: Pulmonary effort is normal.      Breath sounds: Normal breath sounds.   Chest:      Chest wall: No deformity.   Abdominal:      General: Bowel sounds are normal.      Palpations: Abdomen is soft.      Tenderness: There is no abdominal tenderness. Negative signs include Lynn's sign.   Genitourinary:     Comments: Deferred: GYN   Musculoskeletal:         General: Normal range of motion.      Cervical back: Normal range of motion and neck supple.   Lymphadenopathy:      Cervical: No cervical adenopathy.   Skin:     General: Skin is warm and dry.      Capillary Refill: Capillary refill takes 2 to 3 seconds.   Neurological:      Mental Status: She is alert and oriented to person, place, and time.      Cranial Nerves: No cranial nerve deficit.      Sensory: No sensory deficit.      Coordination: Coordination normal.   Psychiatric:         Speech: Speech normal.         Behavior: Behavior normal. Behavior is cooperative.         Thought Content: Thought content normal.         Judgment: Judgment normal.      Result Review  Data Reviewed:{ Labs  Result Review  Imaging  Med Tab  Media :23}                3/16/2023: LPA 24.9, CRP 7.07, estradiol 26.5 homocystine 9.5, B12 greater than 2000,  "vitamin D 24.3,    Glucose 87, creatinine 0.81 sodium 142, potassium 4.5, hemoglobin 15.7, cholesterol 203, HDL 32,     Integrative hormone specialist  [place]ian Test       Vital Signs:   /82 (BP Location: Left arm, Patient Position: Sitting, Cuff Size: Adult)   Pulse 51   Ht 167.6 cm (66\")   Wt 94.1 kg (207 lb 6.4 oz)   SpO2 98%   BMI 33.48 kg/m²         Requested Prescriptions      No prescriptions requested or ordered in this encounter       Routine medications provided by this office will also be refilled via pharmacy request.       Current Outpatient Medications:     Ashwagandha 120 MG capsule, , Disp: , Rfl:     Barberry-Oreg Grape-Goldenseal (Berberine Complex) 200-200-50 MG capsule, , Disp: , Rfl:     DHEA 10 MG capsule, , Disp: , Rfl:     fluocinolone acetonide (DERMOTIC) 0.01 % oil otic oil, , Disp: , Rfl:     NON FORMULARY, Phytostan vitamin supplement, Disp: , Rfl:     nystatin (MYCOSTATIN) 057184 UNIT/GM cream, Apply  topically to the appropriate area as directed See Admin Instructions. Apply topically to the affected area twice daily, Disp: , Rfl:     Progesterone (PROMETRIUM) 200 MG capsule, , Disp: , Rfl:     Testosterone 20 % cream, , Disp: , Rfl:     Turmeric 500 MG capsule, , Disp: , Rfl:     vitamin D (ERGOCALCIFEROL) 1.25 MG (89835 UT) capsule capsule, TAKE 1 CAPSULE BY MOUTH ONE DAY A WEEK WITH FOOD, Disp: , Rfl:      Assessment and Plan:      Assessment and Plan {CC Problem List  Visit Diagnosis  ROS  Review (Popup)  Health Maintenance  Quality  BestPractice  Medications  SmartSets  SnapShot Encounters  Media :23}     Problem List Items Addressed This Visit          Gastrointestinal Abdominal     Diverticulosis (Chronic)    Overview     Advised life long high fiber diet.          Hepatic steatosis    Relevant Orders    HOLCOMB Fibrosure     Other Visit Diagnoses       Annual physical exam    -  Primary    Relevant Orders    Lipid Panel With LDL / HDL Ratio    " Comprehensive Metabolic Panel            Follow Up {Instructions Charge Capture  Follow-up Communications :23}     Return in about 1 year (around 9/14/2024).      Patient was given instructions and counseling regarding her condition or for health maintenance advice. Please see specific information pulled into the AVS if appropriate.    Santiago disclaimer:   Much of this encounter note is an electronic transcription/translation of spoken language to printed text. The electronic translation of spoken language may permit erroneous, or at times, nonsensical words or phrases to be inadvertently transcribed; Although I have reviewed the note for such errors, some may still exist.     Additional Patient Counseling:       Patient Instructions       September is Prostate Cancer Awareness Month   Prostate cancer is the second most common cancer among men.   Screening generally starts at 55 years of age but if you have increased risk factors:   Have at least one first-degree relative (such as your father or brother) who has had prostate cancer  Have at least two extended family members who have had prostate cancer   Are -American, an ethnicity that has a higher risk of developing more aggressive cancers  Your screening could start at a younger age. Please ensure you discuss your risk factors.     https://www.cdc.gov/cancer/prostate/basic_info/index.htm          October is Breast Cancer Awareness Month  Each year more than 245,000 women get breast cancer in the [place].  Each year approximately 2,650 men are diagnosed with breast cancer.     Monitor your breast for changes  Any change in the size or the shape of the breast  Pain in any area of the breast  Nipple discharge other than breast milk (including blood)  A new lump in the breast or underarm  *Continue regular scheduled mammograms    Diet:    Eat vegetables, fruits, whole grain, low-fat dairy, poultry, fish, beans, nontropical vegetable oils, and nuts,  but low amounts of red meat (i.e., Mediterranean-style diet, DASH [Dietary Approaches to Stop Hypertension] diet).  Limit sugary drinks and sweets.  Limit saturated and trans fat to 5% to 6% of calories.  Limit sodium intake to 2,400 mg daily (about one teaspoon table salt [kosher/sea salt have less sodium per teaspoon]).  Fad diets will come and go.  Studies show that the most effective diet is one that you can continue long term.     Weight loss / Calorie Counting Apps:    Lose It!   Vital Metrix Pal   Works great when you try it with a partner/ friend    Exercise:   Engage in moderate-to-vigorous aerobic activity for at least 40 minutes (on average) three to four times each week.    Wearables:   Activity tracker   Step tracker: getting 7,500 steps daily can cut your cardiac risks by 44%     Bone Health:   Https://[email or web address].org/patients/treatment/[email or web address]  Routine weight bearing exercise    Vaccines:   Flu vaccine every fall  https://www.vaccinateyourfamily.org/        COVID booster recommended: newest booster should be out at the end of September  COVID resources: https://govstatus.Projjix/vxqgpok68    Note: you may take COVID booster along with flu vaccine unless contraindicated.

## 2023-09-14 NOTE — PATIENT INSTRUCTIONS
September is Prostate Cancer Awareness Month   Prostate cancer is the second most common cancer among men.   Screening generally starts at 55 years of age but if you have increased risk factors:   Have at least one first-degree relative (such as your father or brother) who has had prostate cancer  Have at least two extended family members who have had prostate cancer   Are -American, an ethnicity that has a higher risk of developing more aggressive cancers  Your screening could start at a younger age. Please ensure you discuss your risk factors.     https://www.cdc.gov/cancer/prostate/basic_info/index.htm          October is Breast Cancer Awareness Month  Each year more than 245,000 women get breast cancer in the [place].  Each year approximately 2,650 men are diagnosed with breast cancer.     Monitor your breast for changes  Any change in the size or the shape of the breast  Pain in any area of the breast  Nipple discharge other than breast milk (including blood)  A new lump in the breast or underarm  *Continue regular scheduled mammograms    Diet:    Eat vegetables, fruits, whole grain, low-fat dairy, poultry, fish, beans, nontropical vegetable oils, and nuts, but low amounts of red meat (i.e., Mediterranean-style diet, DASH [Dietary Approaches to Stop Hypertension] diet).  Limit sugary drinks and sweets.  Limit saturated and trans fat to 5% to 6% of calories.  Limit sodium intake to 2,400 mg daily (about one teaspoon table salt [kosher/sea salt have less sodium per teaspoon]).  Fad diets will come and go.  Studies show that the most effective diet is one that you can continue long term.     Weight loss / Calorie Counting Apps:    Lose It!   [employer] Partner Pal   Works great when you try it with a partner/ friend    Exercise:   Engage in moderate-to-vigorous aerobic activity for at least 40 minutes (on average) three to four times each week.    Wearables:   Activity tracker   Step tracker: getting 7,500  steps daily can cut your cardiac risks by 44%     Bone Health:   Https://www.nof.org/patients/treatment/nutrition/  Routine weight bearing exercise    Vaccines:   Flu vaccine every fall  https://www.vaccinateyourfamily.org/        COVID booster recommended: newest booster should be out at the end of September  COVID resources: https://[email or web address]/xslpetq88    Note: you may take COVID booster along with flu vaccine unless contraindicated.

## 2023-09-19 LAB
A2 MACROGLOB SERPL-MCNC: 203 MG/DL (ref 110–276)
ALBUMIN SERPL-MCNC: 4.8 G/DL (ref 3.8–4.9)
ALBUMIN/GLOB SERPL: 2.3 {RATIO} (ref 1.2–2.2)
ALP SERPL-CCNC: 51 IU/L (ref 44–121)
ALT SERPL W P-5'-P-CCNC: 17 IU/L (ref 0–40)
ALT SERPL-CCNC: 15 IU/L (ref 0–32)
APO A-I SERPL-MCNC: 101 MG/DL (ref 116–209)
AST SERPL W P-5'-P-CCNC: 21 IU/L (ref 0–40)
AST SERPL-CCNC: 20 IU/L (ref 0–40)
BILIRUB SERPL-MCNC: 0.3 MG/DL (ref 0–1.2)
BILIRUB SERPL-MCNC: 0.4 MG/DL (ref 0–1.2)
BUN SERPL-MCNC: 10 MG/DL (ref 6–24)
BUN/CREAT SERPL: 11 (ref 9–23)
CALCIUM SERPL-MCNC: 9.5 MG/DL (ref 8.7–10.2)
CHLORIDE SERPL-SCNC: 105 MMOL/L (ref 96–106)
CHOLEST SERPL-MCNC: 147 MG/DL (ref 100–199)
CHOLEST SERPL-MCNC: 151 MG/DL (ref 100–199)
CO2 SERPL-SCNC: 24 MMOL/L (ref 20–29)
CREAT SERPL-MCNC: 0.87 MG/DL (ref 0.57–1)
EGFRCR SERPLBLD CKD-EPI 2021: 80 ML/MIN/1.73
FIBROSIS SCORING:: ABNORMAL
FIBROSIS STAGE SERPL QL: ABNORMAL
GGT SERPL-CCNC: 15 IU/L (ref 0–60)
GLOBULIN SER CALC-MCNC: 2.1 G/DL (ref 1.5–4.5)
GLUCOSE SERPL-MCNC: 92 MG/DL (ref 70–99)
GLUCOSE SERPL-MCNC: 95 MG/DL (ref 70–99)
HAPTOGLOB SERPL-MCNC: 64 MG/DL (ref 33–346)
HDLC SERPL-MCNC: 34 MG/DL
LABORATORY COMMENT REPORT: ABNORMAL
LDLC SERPL CALC-MCNC: 93 MG/DL (ref 0–99)
LDLC/HDLC SERPL: 2.7 RATIO (ref 0–3.2)
LIVER FIBR SCORE SERPL CALC.FIBROSURE: 0.23 (ref 0–0.21)
LIVER STEATOSIS GRADE SERPL QL: ABNORMAL
LIVER STEATOSIS SCORE SERPL: 0.4 (ref 0–0.4)
NASH GRADE SERPL QL: ABNORMAL
NASH INTERPRETATION SERPL-IMP: ABNORMAL
NASH SCORE SERPL: 0.29 (ref 0–0.25)
NASH SCORING: ABNORMAL
POTASSIUM SERPL-SCNC: 4.8 MMOL/L (ref 3.5–5.2)
PROT SERPL-MCNC: 6.9 G/DL (ref 6–8.5)
SODIUM SERPL-SCNC: 142 MMOL/L (ref 134–144)
STEATOSIS SCORING: ABNORMAL
TEST PERFORMANCE INFO SPEC: ABNORMAL
TEST PERFORMANCE INFO SPEC: ABNORMAL
TRIGL SERPL-MCNC: 108 MG/DL (ref 0–149)
TRIGL SERPL-MCNC: 126 MG/DL (ref 0–149)
VLDLC SERPL CALC-MCNC: 20 MG/DL (ref 5–40)

## (undated) DEVICE — SUT GUT CHRM 5/0 P3 18IN 687G

## (undated) DEVICE — 3M™ STERI-STRIP™ COMPOUND BENZOIN TINCTURE 40 BAGS/CARTON 4 CARTONS/CASE C1544: Brand: 3M™ STERI-STRIP™

## (undated) DEVICE — 3M™ STERI-STRIP™ REINFORCED ADHESIVE SKIN CLOSURES, R1548, 1 IN X 5 IN (25 MM X 125 MM), 4 STRIPS/ENVELOPE: Brand: 3M™ STERI-STRIP™

## (undated) DEVICE — MARKR SKIN W/RULR AND LBL

## (undated) DEVICE — TBG INFILTRATION CB 156IN

## (undated) DEVICE — DRAPE,REIN 53X77,STERILE: Brand: MEDLINE

## (undated) DEVICE — 3M™ STERI-STRIP™ REINFORCED ADHESIVE SKIN CLOSURES, R1547, 1/2 IN X 4 IN (12 MM X 100 MM), 6 STRIPS/ENVELOPE: Brand: 3M™ STERI-STRIP™

## (undated) DEVICE — SYR CONTRL LUERLOK 10CC

## (undated) DEVICE — PAD,ABDOMINAL,8"X10",ST,LF: Brand: MEDLINE

## (undated) DEVICE — PROXIMATE RH ROTATING HEAD SKIN STAPLERS (35 WIDE) CONTAINS 35 STAINLESS STEEL STAPLES: Brand: PROXIMATE

## (undated) DEVICE — ASPIRATION TUBING SET, DISPOSABLE: Brand: MICROAIRE®

## (undated) DEVICE — Device: Brand: FABCO

## (undated) DEVICE — SPNG LAP 18X18IN LF STRL PK/5

## (undated) DEVICE — GLV SURG BIOGEL LTX PF 5 1/2

## (undated) DEVICE — INTENDED FOR TISSUE SEPARATION, AND OTHER PROCEDURES THAT REQUIRE A SHARP SURGICAL BLADE TO PUNCTURE OR CUT.: Brand: BARD-PARKER ® CARBON RIB-BACK BLADES

## (undated) DEVICE — APPL CHLORAPREP HI/LITE 26ML ORNG

## (undated) DEVICE — SYRINGE, LUER LOCK, 60ML: Brand: MEDLINE

## (undated) DEVICE — ELECTRD NDL MEGADYNE EZCLEAN MEGAFINE 2IN

## (undated) DEVICE — COVER,MAYO STAND,STERILE: Brand: MEDLINE

## (undated) DEVICE — NDL HYPO PRECISIONGLIDE REG 25G 1 1/2

## (undated) DEVICE — UNIVERSAL PACK: Brand: CARDINAL HEALTH

## (undated) DEVICE — SPNG GZ WOVN 4X4IN 12PLY 10/BX STRL

## (undated) DEVICE — PK UNIV COMPL 40

## (undated) DEVICE — TOTAL TRAY, 16FR 10ML SIL FOLEY, URN: Brand: MEDLINE

## (undated) DEVICE — LINER CANSTR SXN QUICKFIT 3000CC

## (undated) DEVICE — DRSNG SURESITE WNDW 2.38X2.75

## (undated) DEVICE — EXTRCT STPL SKIN STRL BX/12

## (undated) DEVICE — 1812 FOAM BLOCK NEEDLE COUNTER: Brand: DEVON

## (undated) DEVICE — TOWEL,OR,DSP,ST,BLUE,STD,4/PK,20PK/CS: Brand: MEDLINE

## (undated) DEVICE — SPK PIN NSR FLUID NONVNT 2WY MINI LL LF